# Patient Record
Sex: MALE | Race: WHITE
[De-identification: names, ages, dates, MRNs, and addresses within clinical notes are randomized per-mention and may not be internally consistent; named-entity substitution may affect disease eponyms.]

---

## 2021-09-20 ENCOUNTER — HOSPITAL ENCOUNTER (INPATIENT)
Dept: HOSPITAL 54 - ER | Age: 76
LOS: 12 days | Discharge: NURSING, REHAB OR LONG-TERM CARE | DRG: 208 | End: 2021-10-02
Attending: INTERNAL MEDICINE | Admitting: INTERNAL MEDICINE
Payer: MEDICARE

## 2021-09-20 VITALS — SYSTOLIC BLOOD PRESSURE: 140 MMHG | DIASTOLIC BLOOD PRESSURE: 75 MMHG

## 2021-09-20 VITALS — SYSTOLIC BLOOD PRESSURE: 175 MMHG | DIASTOLIC BLOOD PRESSURE: 85 MMHG

## 2021-09-20 VITALS — DIASTOLIC BLOOD PRESSURE: 55 MMHG | SYSTOLIC BLOOD PRESSURE: 108 MMHG

## 2021-09-20 VITALS — DIASTOLIC BLOOD PRESSURE: 33 MMHG | SYSTOLIC BLOOD PRESSURE: 110 MMHG

## 2021-09-20 VITALS — SYSTOLIC BLOOD PRESSURE: 90 MMHG | DIASTOLIC BLOOD PRESSURE: 53 MMHG

## 2021-09-20 VITALS — SYSTOLIC BLOOD PRESSURE: 120 MMHG | DIASTOLIC BLOOD PRESSURE: 55 MMHG

## 2021-09-20 VITALS — SYSTOLIC BLOOD PRESSURE: 110 MMHG | DIASTOLIC BLOOD PRESSURE: 58 MMHG

## 2021-09-20 VITALS — SYSTOLIC BLOOD PRESSURE: 126 MMHG | DIASTOLIC BLOOD PRESSURE: 72 MMHG

## 2021-09-20 VITALS — DIASTOLIC BLOOD PRESSURE: 70 MMHG | SYSTOLIC BLOOD PRESSURE: 120 MMHG

## 2021-09-20 VITALS — SYSTOLIC BLOOD PRESSURE: 95 MMHG | DIASTOLIC BLOOD PRESSURE: 58 MMHG

## 2021-09-20 VITALS — WEIGHT: 158 LBS | BODY MASS INDEX: 31.02 KG/M2 | HEIGHT: 60 IN

## 2021-09-20 VITALS — DIASTOLIC BLOOD PRESSURE: 52 MMHG | SYSTOLIC BLOOD PRESSURE: 107 MMHG

## 2021-09-20 VITALS — DIASTOLIC BLOOD PRESSURE: 82 MMHG | SYSTOLIC BLOOD PRESSURE: 180 MMHG

## 2021-09-20 VITALS — SYSTOLIC BLOOD PRESSURE: 110 MMHG | DIASTOLIC BLOOD PRESSURE: 87 MMHG

## 2021-09-20 VITALS — DIASTOLIC BLOOD PRESSURE: 54 MMHG | SYSTOLIC BLOOD PRESSURE: 120 MMHG

## 2021-09-20 VITALS — SYSTOLIC BLOOD PRESSURE: 150 MMHG | DIASTOLIC BLOOD PRESSURE: 80 MMHG

## 2021-09-20 VITALS — SYSTOLIC BLOOD PRESSURE: 94 MMHG | DIASTOLIC BLOOD PRESSURE: 61 MMHG

## 2021-09-20 DIAGNOSIS — J15.6: Primary | ICD-10-CM

## 2021-09-20 DIAGNOSIS — M41.9: ICD-10-CM

## 2021-09-20 DIAGNOSIS — I25.10: ICD-10-CM

## 2021-09-20 DIAGNOSIS — Z20.822: ICD-10-CM

## 2021-09-20 DIAGNOSIS — Z79.899: ICD-10-CM

## 2021-09-20 DIAGNOSIS — N17.9: ICD-10-CM

## 2021-09-20 DIAGNOSIS — I50.33: ICD-10-CM

## 2021-09-20 DIAGNOSIS — J96.22: ICD-10-CM

## 2021-09-20 DIAGNOSIS — N18.9: ICD-10-CM

## 2021-09-20 DIAGNOSIS — E66.2: ICD-10-CM

## 2021-09-20 DIAGNOSIS — Z98.1: ICD-10-CM

## 2021-09-20 DIAGNOSIS — H10.9: ICD-10-CM

## 2021-09-20 DIAGNOSIS — E87.6: ICD-10-CM

## 2021-09-20 DIAGNOSIS — N40.0: ICD-10-CM

## 2021-09-20 DIAGNOSIS — J96.21: ICD-10-CM

## 2021-09-20 DIAGNOSIS — G93.41: ICD-10-CM

## 2021-09-20 DIAGNOSIS — I13.0: ICD-10-CM

## 2021-09-20 DIAGNOSIS — Z88.8: ICD-10-CM

## 2021-09-20 DIAGNOSIS — E78.5: ICD-10-CM

## 2021-09-20 DIAGNOSIS — J98.4: ICD-10-CM

## 2021-09-20 DIAGNOSIS — G62.9: ICD-10-CM

## 2021-09-20 DIAGNOSIS — E83.42: ICD-10-CM

## 2021-09-20 DIAGNOSIS — Z88.5: ICD-10-CM

## 2021-09-20 DIAGNOSIS — F20.9: ICD-10-CM

## 2021-09-20 LAB
ALBUMIN SERPL BCP-MCNC: 3.3 G/DL (ref 3.4–5)
ALP SERPL-CCNC: 87 U/L (ref 46–116)
ALT SERPL W P-5'-P-CCNC: 28 U/L (ref 12–78)
AST SERPL W P-5'-P-CCNC: 24 U/L (ref 15–37)
BASE EXCESS BLDA CALC-SCNC: 10.5 MMOL/L
BASE EXCESS BLDA CALC-SCNC: 6.6 MMOL/L
BASE EXCESS BLDA CALC-SCNC: 7.8 MMOL/L
BASE EXCESS BLDA CALC-SCNC: 8.3 MMOL/L
BASOPHILS # BLD AUTO: 0 K/UL (ref 0–0.2)
BASOPHILS NFR BLD AUTO: 0.4 % (ref 0–2)
BILIRUB DIRECT SERPL-MCNC: 0.1 MG/DL (ref 0–0.2)
BILIRUB SERPL-MCNC: 0.4 MG/DL (ref 0.2–1)
BUN SERPL-MCNC: 19 MG/DL (ref 7–18)
CALCIUM SERPL-MCNC: 8.6 MG/DL (ref 8.5–10.1)
CHLORIDE SERPL-SCNC: 103 MMOL/L (ref 98–107)
CO2 SERPL-SCNC: 41 MMOL/L (ref 21–32)
CREAT SERPL-MCNC: 0.8 MG/DL (ref 0.6–1.3)
DO-HGB MFR BLDA: 0 MMHG
DO-HGB MFR BLDA: 161.5 MMHG
DO-HGB MFR BLDA: 185 MMHG
DO-HGB MFR BLDA: 43.2 MMHG
EOSINOPHIL NFR BLD AUTO: 0 % (ref 0–6)
GLUCOSE SERPL-MCNC: 141 MG/DL (ref 74–106)
HCT VFR BLD AUTO: 43 % (ref 39–51)
HGB BLD-MCNC: 14.3 G/DL (ref 13.5–17.5)
INHALED O2 CONCENTRATION: 35 %
INHALED O2 CONCENTRATION: 35 %
INHALED O2 CONCENTRATION: 40 %
INHALED O2 CONCENTRATION: 52 %
INHALED O2 FLOW RATE: 8 L/MIN (ref 0–30)
INTRINSIC PEEP RESPIRATORY: 5 CM H2O
LYMPHOCYTES NFR BLD AUTO: 0.8 K/UL (ref 0.8–4.8)
LYMPHOCYTES NFR BLD AUTO: 12.7 % (ref 20–44)
MCHC RBC AUTO-ENTMCNC: 34 G/DL (ref 31–36)
MCV RBC AUTO: 101 FL (ref 80–96)
MONOCYTES NFR BLD AUTO: 0.6 K/UL (ref 0.1–1.3)
MONOCYTES NFR BLD AUTO: 10.4 % (ref 2–12)
NEUTROPHILS # BLD AUTO: 4.6 K/UL (ref 1.8–8.9)
NEUTROPHILS NFR BLD AUTO: 76.5 % (ref 43–81)
PCO2 TEMP ADJ BLDA: 40.7 MMHG (ref 35–45)
PCO2 TEMP ADJ BLDA: 85.3 MMHG (ref 35–45)
PCO2 TEMP ADJ BLDA: 91.8 MMHG (ref 35–45)
PCO2 TEMP ADJ BLDA: 99.9 MMHG (ref 35–45)
PEEP SETTING VENT: 450 ML
PH TEMP ADJ BLDA: 7.22 [PH] (ref 7.35–7.45)
PH TEMP ADJ BLDA: 7.23 [PH] (ref 7.35–7.45)
PH TEMP ADJ BLDA: 7.3 [PH] (ref 7.35–7.45)
PH TEMP ADJ BLDA: 7.51 [PH] (ref 7.35–7.45)
PLATELET # BLD AUTO: 202 K/UL (ref 150–450)
PO2 TEMP ADJ BLDA: 142.6 MMHG (ref 75–100)
PO2 TEMP ADJ BLDA: 76.9 MMHG (ref 75–100)
PO2 TEMP ADJ BLDA: 90.2 MMHG (ref 75–100)
PO2 TEMP ADJ BLDA: 99.6 MMHG (ref 75–100)
POTASSIUM SERPL-SCNC: 4.3 MMOL/L (ref 3.5–5.1)
PROT SERPL-MCNC: 7.9 G/DL (ref 6.4–8.2)
RBC # BLD AUTO: 4.22 MIL/UL (ref 4.5–6)
SAO2 % BLDA: 95.5 % (ref 92–98.5)
SAO2 % BLDA: 96.4 % (ref 92–98.5)
SAO2 % BLDA: 96.7 % (ref 92–98.5)
SAO2 % BLDA: 98.4 % (ref 92–98.5)
SET RATE, BG: 12
SET RATE, BG: 12
SET RATE, BG: 18
SODIUM SERPL-SCNC: 143 MMOL/L (ref 136–145)
VENTILATION MODE VENT: (no result)
WBC NRBC COR # BLD AUTO: 6.1 K/UL (ref 4.3–11)

## 2021-09-20 PROCEDURE — 5A1945Z RESPIRATORY VENTILATION, 24-96 CONSECUTIVE HOURS: ICD-10-PCS | Performed by: INTERNAL MEDICINE

## 2021-09-20 PROCEDURE — G0378 HOSPITAL OBSERVATION PER HR: HCPCS

## 2021-09-20 PROCEDURE — C9803 HOPD COVID-19 SPEC COLLECT: HCPCS

## 2021-09-20 PROCEDURE — A4217 STERILE WATER/SALINE, 500 ML: HCPCS

## 2021-09-20 PROCEDURE — A9563 P32 NA PHOSPHATE: HCPCS

## 2021-09-20 PROCEDURE — A6403 STERILE GAUZE>16 <= 48 SQ IN: HCPCS

## 2021-09-20 PROCEDURE — A4623 TRACHEOSTOMY INNER CANNULA: HCPCS

## 2021-09-20 PROCEDURE — U0003 INFECTIOUS AGENT DETECTION BY NUCLEIC ACID (DNA OR RNA); SEVERE ACUTE RESPIRATORY SYNDROME CORONAVIRUS 2 (SARS-COV-2) (CORONAVIRUS DISEASE [COVID-19]), AMPLIFIED PROBE TECHNIQUE, MAKING USE OF HIGH THROUGHPUT TECHNOLOGIES AS DESCRIBED BY CMS-2020-01-R: HCPCS

## 2021-09-20 PROCEDURE — 0BH18EZ INSERTION OF ENDOTRACHEAL AIRWAY INTO TRACHEA, VIA NATURAL OR ARTIFICIAL OPENING ENDOSCOPIC: ICD-10-PCS

## 2021-09-20 PROCEDURE — 05H933Z INSERTION OF INFUSION DEVICE INTO RIGHT BRACHIAL VEIN, PERCUTANEOUS APPROACH: ICD-10-PCS | Performed by: NURSE PRACTITIONER

## 2021-09-20 PROCEDURE — 5A09357 ASSISTANCE WITH RESPIRATORY VENTILATION, LESS THAN 24 CONSECUTIVE HOURS, CONTINUOUS POSITIVE AIRWAY PRESSURE: ICD-10-PCS | Performed by: INTERNAL MEDICINE

## 2021-09-20 RX ADMIN — DEXTROSE MONOHYDRATE SCH MLS/HR: 50 INJECTION, SOLUTION INTRAVENOUS at 16:27

## 2021-09-20 RX ADMIN — METOPROLOL TARTRATE SCH MG: 50 TABLET, FILM COATED ORAL at 17:54

## 2021-09-20 RX ADMIN — Medication SCH MG: at 22:00

## 2021-09-20 RX ADMIN — PIPERACILLIN SODIUM AND TAZOBACTAM SODIUM SCH MLS/HR: .5; 4 INJECTION, POWDER, LYOPHILIZED, FOR SOLUTION INTRAVENOUS at 18:52

## 2021-09-20 RX ADMIN — Medication SCH MG: at 17:54

## 2021-09-20 RX ADMIN — GABAPENTIN SCH MG: 100 CAPSULE ORAL at 13:00

## 2021-09-20 RX ADMIN — METOPROLOL TARTRATE SCH MG: 50 TABLET, FILM COATED ORAL at 17:00

## 2021-09-20 RX ADMIN — FLUVOXAMINE MALEATE SCH MG: 50 TABLET, FILM COATED ORAL at 22:00

## 2021-09-20 RX ADMIN — PROPOFOL PRN MLS/HR: 10 INJECTION, EMULSION INTRAVENOUS at 17:52

## 2021-09-20 RX ADMIN — SODIUM CHLORIDE PRN MLS/HR: 9 INJECTION, SOLUTION INTRAVENOUS at 18:53

## 2021-09-20 RX ADMIN — GABAPENTIN SCH MG: 100 CAPSULE ORAL at 18:54

## 2021-09-20 RX ADMIN — FAMOTIDINE SCH MG: 20 TABLET, FILM COATED ORAL at 17:53

## 2021-09-20 RX ADMIN — ENOXAPARIN SODIUM SCH MG: 40 INJECTION SUBCUTANEOUS at 13:10

## 2021-09-20 RX ADMIN — TAMSULOSIN HYDROCHLORIDE SCH MG: 0.4 CAPSULE ORAL at 18:52

## 2021-09-20 NOTE — NUR
RN ICU

ADMITTED INTO ROOM 260 FROM ER BY [facility]ANTE WITH MONITOR.  REPORT RECEIVED FROM [facility].  PT 
ADMITTED FOR RESP FAILURE/CHF.  PT AROUSEABLE TO VIGOROUS STIMULATION.  EASILY FALLS ASLEEP. 
 PLACED ON BIPAP.

## 2021-09-20 NOTE — NUR
ICU RN. INITIAL ASSESSMENT. RECEIVED THE PT REST ON THE BED. ORALLY INTUABTED. ETT 7.5CM, 
LIP 27, AC 18, , FIO2 40%, PEEP 5. SAT 98%. NO ACUTE DISTRESS NOTED. CARDIAC MONITOR 
SHOWING NSR. IV RT AND LT HAND 20G. PROPOFOL 20MCG/KG/MIN, NGT INTACT. FC PATENT. WILL 
CONTINUE TO MONITOR .

## 2021-09-20 NOTE — NUR
-------------------------------------------------------------------------------

          *** Note undone in [facility] - 09/20/21 at 1119 by MIHAELA ***           

-------------------------------------------------------------------------------

ER AT [facility] FOR ABG.

## 2021-09-20 NOTE — NUR
RT END OF THE SHIFT REPORT,



PT. 76 Y OLD MALE REC. FROM ER T/O DAY ON BIPAP, POST ABG BIPAP CHANGES DONE PER DR. LIMON

@[id number] ASSISTED ER DR. DASILVA X2 RT FOR INTUBATION, ETT 7.5 @ 25 CM, GOOD COLOR EXCHANGED 
VIA CAPNOGRAPHY, BILATERALLY CHEST RISE NOTED B/S RHONCHI PLACED ON VENT WITH NOTED 
SETTINGS, VENT PLUGGED INTO RED OUTLET POST INTUBATION ETT ADJUSTED PER DR. LIMON AT THE 
BEDSIDE. PUSH DOWN 2CM (ETT 27CM LIPLINE) POST ABG VT CHANGED  PER DR. LIMON ORDER. RN 
NOTIFIED THE CHANGES. PT. TABLE. AMBU BAG REMAIN AT THE BEDSIDE. 



REPORT WILL PASS TO PM SHIFT 

-------------------------------------------------------------------------------

Addendum: 09/20/21 at 1813 by LORENA GOMES RT

-------------------------------------------------------------------------------

Amended: Links added.

## 2021-09-20 NOTE — NUR
RT



POST ABG RESULTS SHOWN TO DR. DASILVA. PLACED PT ON BIPAP PER MD ORDER. [facility] RN NOTIFIED 
AND AWARE OF CHANGES. WILL CONTINUE TO MONITOR THE PATIENT CLOSELY FOR ANY CHANGES. 

-------------------------------------------------------------------------------

Addendum: 09/20/21 at 1400 by HELEN MITCHELL RT

-------------------------------------------------------------------------------

Amended: Links added.

## 2021-09-21 VITALS — DIASTOLIC BLOOD PRESSURE: 56 MMHG | SYSTOLIC BLOOD PRESSURE: 80 MMHG

## 2021-09-21 VITALS — SYSTOLIC BLOOD PRESSURE: 113 MMHG | DIASTOLIC BLOOD PRESSURE: 73 MMHG

## 2021-09-21 VITALS — DIASTOLIC BLOOD PRESSURE: 67 MMHG | SYSTOLIC BLOOD PRESSURE: 100 MMHG

## 2021-09-21 VITALS — SYSTOLIC BLOOD PRESSURE: 129 MMHG | DIASTOLIC BLOOD PRESSURE: 78 MMHG

## 2021-09-21 VITALS — SYSTOLIC BLOOD PRESSURE: 109 MMHG | DIASTOLIC BLOOD PRESSURE: 61 MMHG

## 2021-09-21 VITALS — SYSTOLIC BLOOD PRESSURE: 129 MMHG | DIASTOLIC BLOOD PRESSURE: 79 MMHG

## 2021-09-21 VITALS — SYSTOLIC BLOOD PRESSURE: 111 MMHG | DIASTOLIC BLOOD PRESSURE: 63 MMHG

## 2021-09-21 VITALS — DIASTOLIC BLOOD PRESSURE: 69 MMHG | SYSTOLIC BLOOD PRESSURE: 115 MMHG

## 2021-09-21 VITALS — DIASTOLIC BLOOD PRESSURE: 80 MMHG | SYSTOLIC BLOOD PRESSURE: 138 MMHG

## 2021-09-21 VITALS — SYSTOLIC BLOOD PRESSURE: 117 MMHG | DIASTOLIC BLOOD PRESSURE: 75 MMHG

## 2021-09-21 VITALS — DIASTOLIC BLOOD PRESSURE: 60 MMHG | SYSTOLIC BLOOD PRESSURE: 111 MMHG

## 2021-09-21 VITALS — DIASTOLIC BLOOD PRESSURE: 67 MMHG | SYSTOLIC BLOOD PRESSURE: 111 MMHG

## 2021-09-21 VITALS — DIASTOLIC BLOOD PRESSURE: 73 MMHG | SYSTOLIC BLOOD PRESSURE: 116 MMHG

## 2021-09-21 VITALS — SYSTOLIC BLOOD PRESSURE: 106 MMHG | DIASTOLIC BLOOD PRESSURE: 63 MMHG

## 2021-09-21 VITALS — DIASTOLIC BLOOD PRESSURE: 70 MMHG | SYSTOLIC BLOOD PRESSURE: 116 MMHG

## 2021-09-21 VITALS — SYSTOLIC BLOOD PRESSURE: 109 MMHG | DIASTOLIC BLOOD PRESSURE: 70 MMHG

## 2021-09-21 VITALS — DIASTOLIC BLOOD PRESSURE: 70 MMHG | SYSTOLIC BLOOD PRESSURE: 111 MMHG

## 2021-09-21 VITALS — SYSTOLIC BLOOD PRESSURE: 117 MMHG | DIASTOLIC BLOOD PRESSURE: 65 MMHG

## 2021-09-21 VITALS — DIASTOLIC BLOOD PRESSURE: 84 MMHG | SYSTOLIC BLOOD PRESSURE: 121 MMHG

## 2021-09-21 VITALS — SYSTOLIC BLOOD PRESSURE: 110 MMHG | DIASTOLIC BLOOD PRESSURE: 67 MMHG

## 2021-09-21 VITALS — DIASTOLIC BLOOD PRESSURE: 59 MMHG | SYSTOLIC BLOOD PRESSURE: 115 MMHG

## 2021-09-21 VITALS — SYSTOLIC BLOOD PRESSURE: 107 MMHG | DIASTOLIC BLOOD PRESSURE: 65 MMHG

## 2021-09-21 VITALS — SYSTOLIC BLOOD PRESSURE: 118 MMHG | DIASTOLIC BLOOD PRESSURE: 74 MMHG

## 2021-09-21 VITALS — SYSTOLIC BLOOD PRESSURE: 96 MMHG | DIASTOLIC BLOOD PRESSURE: 53 MMHG

## 2021-09-21 VITALS — SYSTOLIC BLOOD PRESSURE: 120 MMHG | DIASTOLIC BLOOD PRESSURE: 60 MMHG

## 2021-09-21 VITALS — DIASTOLIC BLOOD PRESSURE: 70 MMHG | SYSTOLIC BLOOD PRESSURE: 97 MMHG

## 2021-09-21 VITALS — DIASTOLIC BLOOD PRESSURE: 64 MMHG | SYSTOLIC BLOOD PRESSURE: 111 MMHG

## 2021-09-21 VITALS — SYSTOLIC BLOOD PRESSURE: 111 MMHG | DIASTOLIC BLOOD PRESSURE: 71 MMHG

## 2021-09-21 VITALS — SYSTOLIC BLOOD PRESSURE: 99 MMHG | DIASTOLIC BLOOD PRESSURE: 67 MMHG

## 2021-09-21 VITALS — DIASTOLIC BLOOD PRESSURE: 68 MMHG | SYSTOLIC BLOOD PRESSURE: 115 MMHG

## 2021-09-21 VITALS — SYSTOLIC BLOOD PRESSURE: 79 MMHG | DIASTOLIC BLOOD PRESSURE: 54 MMHG

## 2021-09-21 VITALS — SYSTOLIC BLOOD PRESSURE: 122 MMHG | DIASTOLIC BLOOD PRESSURE: 75 MMHG

## 2021-09-21 VITALS — SYSTOLIC BLOOD PRESSURE: 115 MMHG | DIASTOLIC BLOOD PRESSURE: 73 MMHG

## 2021-09-21 VITALS — DIASTOLIC BLOOD PRESSURE: 49 MMHG | SYSTOLIC BLOOD PRESSURE: 130 MMHG

## 2021-09-21 VITALS — SYSTOLIC BLOOD PRESSURE: 108 MMHG | DIASTOLIC BLOOD PRESSURE: 65 MMHG

## 2021-09-21 VITALS — DIASTOLIC BLOOD PRESSURE: 73 MMHG | SYSTOLIC BLOOD PRESSURE: 117 MMHG

## 2021-09-21 VITALS — SYSTOLIC BLOOD PRESSURE: 82 MMHG | DIASTOLIC BLOOD PRESSURE: 52 MMHG

## 2021-09-21 VITALS — SYSTOLIC BLOOD PRESSURE: 127 MMHG | DIASTOLIC BLOOD PRESSURE: 75 MMHG

## 2021-09-21 VITALS — DIASTOLIC BLOOD PRESSURE: 72 MMHG | SYSTOLIC BLOOD PRESSURE: 119 MMHG

## 2021-09-21 VITALS — DIASTOLIC BLOOD PRESSURE: 60 MMHG | SYSTOLIC BLOOD PRESSURE: 101 MMHG

## 2021-09-21 VITALS — DIASTOLIC BLOOD PRESSURE: 66 MMHG | SYSTOLIC BLOOD PRESSURE: 121 MMHG

## 2021-09-21 VITALS — DIASTOLIC BLOOD PRESSURE: 81 MMHG | SYSTOLIC BLOOD PRESSURE: 111 MMHG

## 2021-09-21 VITALS — SYSTOLIC BLOOD PRESSURE: 134 MMHG | DIASTOLIC BLOOD PRESSURE: 85 MMHG

## 2021-09-21 VITALS — SYSTOLIC BLOOD PRESSURE: 131 MMHG | DIASTOLIC BLOOD PRESSURE: 67 MMHG

## 2021-09-21 VITALS — SYSTOLIC BLOOD PRESSURE: 110 MMHG | DIASTOLIC BLOOD PRESSURE: 69 MMHG

## 2021-09-21 VITALS — DIASTOLIC BLOOD PRESSURE: 78 MMHG | SYSTOLIC BLOOD PRESSURE: 117 MMHG

## 2021-09-21 VITALS — DIASTOLIC BLOOD PRESSURE: 66 MMHG | SYSTOLIC BLOOD PRESSURE: 116 MMHG

## 2021-09-21 VITALS — SYSTOLIC BLOOD PRESSURE: 136 MMHG | DIASTOLIC BLOOD PRESSURE: 79 MMHG

## 2021-09-21 VITALS — DIASTOLIC BLOOD PRESSURE: 33 MMHG | SYSTOLIC BLOOD PRESSURE: 83 MMHG

## 2021-09-21 VITALS — DIASTOLIC BLOOD PRESSURE: 54 MMHG | SYSTOLIC BLOOD PRESSURE: 95 MMHG

## 2021-09-21 VITALS — DIASTOLIC BLOOD PRESSURE: 62 MMHG | SYSTOLIC BLOOD PRESSURE: 106 MMHG

## 2021-09-21 VITALS — DIASTOLIC BLOOD PRESSURE: 68 MMHG | SYSTOLIC BLOOD PRESSURE: 125 MMHG

## 2021-09-21 VITALS — DIASTOLIC BLOOD PRESSURE: 62 MMHG | SYSTOLIC BLOOD PRESSURE: 113 MMHG

## 2021-09-21 VITALS — SYSTOLIC BLOOD PRESSURE: 108 MMHG | DIASTOLIC BLOOD PRESSURE: 63 MMHG

## 2021-09-21 VITALS — DIASTOLIC BLOOD PRESSURE: 69 MMHG | SYSTOLIC BLOOD PRESSURE: 97 MMHG

## 2021-09-21 VITALS — SYSTOLIC BLOOD PRESSURE: 101 MMHG | DIASTOLIC BLOOD PRESSURE: 59 MMHG

## 2021-09-21 VITALS — SYSTOLIC BLOOD PRESSURE: 122 MMHG | DIASTOLIC BLOOD PRESSURE: 71 MMHG

## 2021-09-21 VITALS — SYSTOLIC BLOOD PRESSURE: 102 MMHG | DIASTOLIC BLOOD PRESSURE: 60 MMHG

## 2021-09-21 VITALS — DIASTOLIC BLOOD PRESSURE: 76 MMHG | SYSTOLIC BLOOD PRESSURE: 114 MMHG

## 2021-09-21 VITALS — DIASTOLIC BLOOD PRESSURE: 59 MMHG | SYSTOLIC BLOOD PRESSURE: 94 MMHG

## 2021-09-21 LAB
ALBUMIN SERPL BCP-MCNC: 3.4 G/DL (ref 3.4–5)
ALP SERPL-CCNC: 88 U/L (ref 46–116)
ALT SERPL W P-5'-P-CCNC: 28 U/L (ref 12–78)
AST SERPL W P-5'-P-CCNC: 28 U/L (ref 15–37)
BASE EXCESS BLDA CALC-SCNC: -0.6 MMOL/L
BASOPHILS # BLD AUTO: 0.1 K/UL (ref 0–0.2)
BASOPHILS NFR BLD AUTO: 0.4 % (ref 0–2)
BILIRUB SERPL-MCNC: 1.4 MG/DL (ref 0.2–1)
BUN SERPL-MCNC: 24 MG/DL (ref 7–18)
CALCIUM SERPL-MCNC: 8.8 MG/DL (ref 8.5–10.1)
CHLORIDE SERPL-SCNC: 99 MMOL/L (ref 98–107)
CHOLEST SERPL-MCNC: 124 MG/DL (ref ?–200)
CO2 SERPL-SCNC: 30 MMOL/L (ref 21–32)
CREAT SERPL-MCNC: 1.2 MG/DL (ref 0.6–1.3)
DO-HGB MFR BLDA: 73.8 MMHG
EOSINOPHIL NFR BLD AUTO: 0.1 % (ref 0–6)
GLUCOSE SERPL-MCNC: 166 MG/DL (ref 74–106)
HCT VFR BLD AUTO: 46 % (ref 39–51)
HDLC SERPL-MCNC: 48 MG/DL (ref 40–60)
HGB BLD-MCNC: 15.3 G/DL (ref 13.5–17.5)
INHALED O2 CONCENTRATION: 40 %
LDLC SERPL DIRECT ASSAY-MCNC: 61 MG/DL (ref 0–99)
LYMPHOCYTES NFR BLD AUTO: 0.6 K/UL (ref 0.8–4.8)
LYMPHOCYTES NFR BLD AUTO: 4.1 % (ref 20–44)
LYMPHOCYTES NFR BLD MANUAL: 5 % (ref 16–48)
MAGNESIUM SERPL-MCNC: 1.7 MG/DL (ref 1.8–2.4)
MCHC RBC AUTO-ENTMCNC: 33 G/DL (ref 31–36)
MCV RBC AUTO: 101 FL (ref 80–96)
MONOCYTES NFR BLD AUTO: 15.4 % (ref 2–12)
MONOCYTES NFR BLD AUTO: 2.1 K/UL (ref 0.1–1.3)
MONOCYTES NFR BLD MANUAL: 16 % (ref 0–11)
NEUTROPHILS # BLD AUTO: 10.9 K/UL (ref 1.8–8.9)
NEUTROPHILS NFR BLD AUTO: 80 % (ref 43–81)
NEUTS SEG NFR BLD MANUAL: 79 % (ref 42–76)
PCO2 TEMP ADJ BLDA: 35.3 MMHG (ref 35–45)
PH TEMP ADJ BLDA: 7.43 [PH] (ref 7.35–7.45)
PHOSPHATE SERPL-MCNC: 2.4 MG/DL (ref 2.5–4.9)
PLATELET # BLD AUTO: 243 K/UL (ref 150–450)
PO2 TEMP ADJ BLDA: 170.8 MMHG (ref 75–100)
POTASSIUM SERPL-SCNC: 3.2 MMOL/L (ref 3.5–5.1)
PROT SERPL-MCNC: 8.2 G/DL (ref 6.4–8.2)
RBC # BLD AUTO: 4.6 MIL/UL (ref 4.5–6)
SAO2 % BLDA: 99.3 % (ref 92–98.5)
SODIUM SERPL-SCNC: 140 MMOL/L (ref 136–145)
TRIGL SERPL-MCNC: 143 MG/DL (ref 30–150)
VENTILATION MODE VENT: (no result)
WBC NRBC COR # BLD AUTO: 13.6 K/UL (ref 4.3–11)

## 2021-09-21 RX ADMIN — FAMOTIDINE SCH MG: 20 TABLET, FILM COATED ORAL at 17:17

## 2021-09-21 RX ADMIN — PROPOFOL PRN MLS/HR: 10 INJECTION, EMULSION INTRAVENOUS at 06:50

## 2021-09-21 RX ADMIN — GABAPENTIN SCH MG: 100 CAPSULE ORAL at 08:33

## 2021-09-21 RX ADMIN — PROPOFOL PRN MLS/HR: 10 INJECTION, EMULSION INTRAVENOUS at 20:37

## 2021-09-21 RX ADMIN — MAGNESIUM SULFATE IN DEXTROSE SCH MLS/HR: 10 INJECTION, SOLUTION INTRAVENOUS at 10:04

## 2021-09-21 RX ADMIN — SODIUM CHLORIDE PRN MLS/HR: 9 INJECTION, SOLUTION INTRAVENOUS at 03:43

## 2021-09-21 RX ADMIN — POTASSIUM CHLORIDE SCH MLS/HR: 200 INJECTION, SOLUTION INTRAVENOUS at 10:03

## 2021-09-21 RX ADMIN — FAMOTIDINE SCH MG: 20 TABLET, FILM COATED ORAL at 08:33

## 2021-09-21 RX ADMIN — POTASSIUM CHLORIDE SCH MLS/HR: 200 INJECTION, SOLUTION INTRAVENOUS at 13:11

## 2021-09-21 RX ADMIN — ATORVASTATIN CALCIUM SCH MG: 40 TABLET, FILM COATED ORAL at 00:52

## 2021-09-21 RX ADMIN — PROPOFOL PRN MLS/HR: 10 INJECTION, EMULSION INTRAVENOUS at 10:04

## 2021-09-21 RX ADMIN — ENOXAPARIN SODIUM SCH MG: 40 INJECTION SUBCUTANEOUS at 14:28

## 2021-09-21 RX ADMIN — GABAPENTIN SCH MG: 100 CAPSULE ORAL at 17:17

## 2021-09-21 RX ADMIN — PIPERACILLIN SODIUM AND TAZOBACTAM SODIUM SCH MLS/HR: .375; 3 INJECTION, POWDER, LYOPHILIZED, FOR SOLUTION INTRAVENOUS at 19:57

## 2021-09-21 RX ADMIN — METOPROLOL TARTRATE SCH MG: 50 TABLET, FILM COATED ORAL at 08:30

## 2021-09-21 RX ADMIN — PIPERACILLIN SODIUM AND TAZOBACTAM SODIUM SCH MLS/HR: .5; 4 INJECTION, POWDER, LYOPHILIZED, FOR SOLUTION INTRAVENOUS at 05:19

## 2021-09-21 RX ADMIN — Medication SCH MG: at 17:18

## 2021-09-21 RX ADMIN — GABAPENTIN SCH MG: 100 CAPSULE ORAL at 13:00

## 2021-09-21 RX ADMIN — ATORVASTATIN CALCIUM SCH MG: 40 TABLET, FILM COATED ORAL at 21:41

## 2021-09-21 RX ADMIN — PIPERACILLIN SODIUM AND TAZOBACTAM SODIUM SCH MLS/HR: .375; 3 INJECTION, POWDER, LYOPHILIZED, FOR SOLUTION INTRAVENOUS at 12:09

## 2021-09-21 RX ADMIN — POTASSIUM CHLORIDE SCH MLS/HR: 200 INJECTION, SOLUTION INTRAVENOUS at 12:08

## 2021-09-21 RX ADMIN — POTASSIUM CHLORIDE SCH MLS/HR: 200 INJECTION, SOLUTION INTRAVENOUS at 11:03

## 2021-09-21 RX ADMIN — Medication SCH MG: at 08:33

## 2021-09-21 RX ADMIN — FERROUS SULFATE TAB 325 MG (65 MG ELEMENTAL FE) SCH MG: 325 (65 FE) TAB at 08:33

## 2021-09-21 RX ADMIN — Medication SCH MG: at 21:41

## 2021-09-21 RX ADMIN — SODIUM CHLORIDE PRN MLS/HR: 9 INJECTION, SOLUTION INTRAVENOUS at 12:20

## 2021-09-21 RX ADMIN — METOPROLOL TARTRATE SCH MG: 50 TABLET, FILM COATED ORAL at 17:00

## 2021-09-21 RX ADMIN — FLUVOXAMINE MALEATE SCH MG: 50 TABLET, FILM COATED ORAL at 21:41

## 2021-09-21 RX ADMIN — TAMSULOSIN HYDROCHLORIDE SCH MG: 0.4 CAPSULE ORAL at 17:18

## 2021-09-21 RX ADMIN — PROPOFOL PRN MLS/HR: 10 INJECTION, EMULSION INTRAVENOUS at 15:30

## 2021-09-21 RX ADMIN — PIPERACILLIN SODIUM AND TAZOBACTAM SODIUM SCH MLS/HR: .5; 4 INJECTION, POWDER, LYOPHILIZED, FOR SOLUTION INTRAVENOUS at 00:54

## 2021-09-21 RX ADMIN — MAGNESIUM SULFATE IN DEXTROSE SCH MLS/HR: 10 INJECTION, SOLUTION INTRAVENOUS at 11:03

## 2021-09-21 RX ADMIN — DEXTROSE MONOHYDRATE SCH MLS/HR: 50 INJECTION, SOLUTION INTRAVENOUS at 08:34

## 2021-09-21 RX ADMIN — PROPOFOL PRN MLS/HR: 10 INJECTION, EMULSION INTRAVENOUS at 00:54

## 2021-09-21 NOTE — NUR
icu rn. am care given. remaining samr vent setting tolerated well. sat 98%. no acute 
distress noted. cardiac monitor showing nsr. iv rt upper arm mid line. diprivan 
40mcg/kg/min, levophed 0.2mcg/kg/min. ngt intact. fc patent. urine draining, hob elevated. 
temp 101.3. cold bath given. will continue to monitor vitals.

## 2021-09-21 NOTE — NUR
RN CLOSING NOTES

Patient sedated and on diprivan drip running 40mcg/kg/min and levo at 0.05 mcg/kg to right 
upper arm midline. Vitals stable and tolerating vent settings. Patient is noted with left ac 
iv access saline lock. Bilateral wrist restraints noted and no s/s of skin breakdown. 
Orogastric tube clamped and patient remains NPO. HOB kept elevated. Velazquez intact and hanging 
to gravity with clear yellow urine noted with output of 300 cc. Colostomy bag noted to right 
lower abdomen with output of 100 cc of green/brown stool. informed NP Roselia regarding the 
diet order and waiting for call back. Endorsement done to next shift to follow up. Will 
continue to monitor. Call light with in reach.

## 2021-09-21 NOTE — NUR
RN OPENING NOTES

Patient received sedated on diprivan  running at 40 mcg/kg/min and levophed running at 
0.2mcg to right upper arm midline. No s/s of respiratory distress. Patient is noted with 
left ac iv access saline lock. Bilateral wrist restraints noted and no s/s of skin 
breakdown. Orogastric tube clamped and patient remains NPO. HOB kept elevated. Velazquez intact 
and hanging to gravity with clear yellow urine noted. Colostomy bag noted to right lower 
abdomen. Will continue to monitor. Call light with in reach.

## 2021-09-21 NOTE — NUR
RN NOTE 



RECEIVED PATIENT ON R/O COVID AIRBORNE ISOLATION. PATIENT IN BED. SEDATED. ON MECHANICAL 
VENT: ET 7.5/ 23 AC 24,  PEEP5. NO RESP DISTRESS. NO S/S PAIN NOTED. TELE MONITOR 
READS SINUS RHYTHM. IN NO APPARENT DISTRESS. IV ACCESS IN ANTONIETTA MIDLINE RUNNING PROPOFOL @40 
AND LEVO @0.05. RAC#20 AND LAC#20 PATENT AND SALINE LOCKED. ANDERS CATHETER IS PRESENT 
DRAINING TO GRAVITY, URINE IS YELLOW. COLOSTOMY BAG IN RLQ. OG TUBE PRESENT, CURRENTLY 
CLAMPED, NO RESIDUAL, PATIENT IS NPO. BED IS LOW AND LOCKED, HOB ELEVATED IN SEMI FOWLERS, 
SIDE RAILS UP X2, WILL CONTINUE TO MONITOR THROUGHOUT SHIFT

## 2021-09-21 NOTE — NUR
RT NOTE

LATE ENTRY: Pt rec'd orally intubated via ETT sz #7.5 secured at 27cm at the lipline. Pt on 
[facility] vent on AC mode settings as charted. ETT is patent and secured. Pt sx'd for thick LARGE 
amt of pink frothy secretions. Alarms are set and audible. Ambu bag bedside. Vent plugged 
into red outlet. Will continue to monitor closely.

-------------------------------------------------------------------------------

Addendum: 09/21/21 at 0641 by ARIEL CASTRO RT

-------------------------------------------------------------------------------

Amended: Links added.

## 2021-09-22 VITALS — SYSTOLIC BLOOD PRESSURE: 115 MMHG | DIASTOLIC BLOOD PRESSURE: 62 MMHG

## 2021-09-22 VITALS — SYSTOLIC BLOOD PRESSURE: 115 MMHG | DIASTOLIC BLOOD PRESSURE: 66 MMHG

## 2021-09-22 VITALS — DIASTOLIC BLOOD PRESSURE: 65 MMHG | SYSTOLIC BLOOD PRESSURE: 112 MMHG

## 2021-09-22 VITALS — DIASTOLIC BLOOD PRESSURE: 73 MMHG | SYSTOLIC BLOOD PRESSURE: 131 MMHG

## 2021-09-22 VITALS — DIASTOLIC BLOOD PRESSURE: 77 MMHG | SYSTOLIC BLOOD PRESSURE: 132 MMHG

## 2021-09-22 VITALS — SYSTOLIC BLOOD PRESSURE: 113 MMHG | DIASTOLIC BLOOD PRESSURE: 68 MMHG

## 2021-09-22 VITALS — SYSTOLIC BLOOD PRESSURE: 124 MMHG | DIASTOLIC BLOOD PRESSURE: 75 MMHG

## 2021-09-22 VITALS — DIASTOLIC BLOOD PRESSURE: 68 MMHG | SYSTOLIC BLOOD PRESSURE: 117 MMHG

## 2021-09-22 VITALS — DIASTOLIC BLOOD PRESSURE: 73 MMHG | SYSTOLIC BLOOD PRESSURE: 112 MMHG

## 2021-09-22 VITALS — SYSTOLIC BLOOD PRESSURE: 118 MMHG | DIASTOLIC BLOOD PRESSURE: 70 MMHG

## 2021-09-22 VITALS — DIASTOLIC BLOOD PRESSURE: 71 MMHG | SYSTOLIC BLOOD PRESSURE: 118 MMHG

## 2021-09-22 VITALS — SYSTOLIC BLOOD PRESSURE: 122 MMHG | DIASTOLIC BLOOD PRESSURE: 73 MMHG

## 2021-09-22 VITALS — SYSTOLIC BLOOD PRESSURE: 117 MMHG | DIASTOLIC BLOOD PRESSURE: 62 MMHG

## 2021-09-22 VITALS — SYSTOLIC BLOOD PRESSURE: 120 MMHG | DIASTOLIC BLOOD PRESSURE: 69 MMHG

## 2021-09-22 VITALS — DIASTOLIC BLOOD PRESSURE: 64 MMHG | SYSTOLIC BLOOD PRESSURE: 103 MMHG

## 2021-09-22 VITALS — SYSTOLIC BLOOD PRESSURE: 125 MMHG | DIASTOLIC BLOOD PRESSURE: 81 MMHG

## 2021-09-22 VITALS — SYSTOLIC BLOOD PRESSURE: 115 MMHG | DIASTOLIC BLOOD PRESSURE: 65 MMHG

## 2021-09-22 VITALS — SYSTOLIC BLOOD PRESSURE: 108 MMHG | DIASTOLIC BLOOD PRESSURE: 65 MMHG

## 2021-09-22 VITALS — SYSTOLIC BLOOD PRESSURE: 109 MMHG | DIASTOLIC BLOOD PRESSURE: 59 MMHG

## 2021-09-22 VITALS — SYSTOLIC BLOOD PRESSURE: 142 MMHG | DIASTOLIC BLOOD PRESSURE: 66 MMHG

## 2021-09-22 VITALS — DIASTOLIC BLOOD PRESSURE: 62 MMHG | SYSTOLIC BLOOD PRESSURE: 117 MMHG

## 2021-09-22 VITALS — SYSTOLIC BLOOD PRESSURE: 127 MMHG | DIASTOLIC BLOOD PRESSURE: 73 MMHG

## 2021-09-22 VITALS — SYSTOLIC BLOOD PRESSURE: 123 MMHG | DIASTOLIC BLOOD PRESSURE: 71 MMHG

## 2021-09-22 VITALS — SYSTOLIC BLOOD PRESSURE: 139 MMHG | DIASTOLIC BLOOD PRESSURE: 75 MMHG

## 2021-09-22 VITALS — DIASTOLIC BLOOD PRESSURE: 79 MMHG | SYSTOLIC BLOOD PRESSURE: 145 MMHG

## 2021-09-22 VITALS — DIASTOLIC BLOOD PRESSURE: 60 MMHG | SYSTOLIC BLOOD PRESSURE: 93 MMHG

## 2021-09-22 VITALS — DIASTOLIC BLOOD PRESSURE: 82 MMHG | SYSTOLIC BLOOD PRESSURE: 137 MMHG

## 2021-09-22 VITALS — DIASTOLIC BLOOD PRESSURE: 65 MMHG | SYSTOLIC BLOOD PRESSURE: 108 MMHG

## 2021-09-22 VITALS — DIASTOLIC BLOOD PRESSURE: 80 MMHG | SYSTOLIC BLOOD PRESSURE: 128 MMHG

## 2021-09-22 VITALS — DIASTOLIC BLOOD PRESSURE: 71 MMHG | SYSTOLIC BLOOD PRESSURE: 116 MMHG

## 2021-09-22 VITALS — DIASTOLIC BLOOD PRESSURE: 78 MMHG | SYSTOLIC BLOOD PRESSURE: 118 MMHG

## 2021-09-22 VITALS — SYSTOLIC BLOOD PRESSURE: 95 MMHG | DIASTOLIC BLOOD PRESSURE: 49 MMHG

## 2021-09-22 VITALS — SYSTOLIC BLOOD PRESSURE: 101 MMHG | DIASTOLIC BLOOD PRESSURE: 62 MMHG

## 2021-09-22 VITALS — DIASTOLIC BLOOD PRESSURE: 69 MMHG | SYSTOLIC BLOOD PRESSURE: 121 MMHG

## 2021-09-22 VITALS — DIASTOLIC BLOOD PRESSURE: 69 MMHG | SYSTOLIC BLOOD PRESSURE: 115 MMHG

## 2021-09-22 VITALS — DIASTOLIC BLOOD PRESSURE: 60 MMHG | SYSTOLIC BLOOD PRESSURE: 107 MMHG

## 2021-09-22 VITALS — SYSTOLIC BLOOD PRESSURE: 98 MMHG | DIASTOLIC BLOOD PRESSURE: 63 MMHG

## 2021-09-22 VITALS — SYSTOLIC BLOOD PRESSURE: 126 MMHG | DIASTOLIC BLOOD PRESSURE: 66 MMHG

## 2021-09-22 VITALS — SYSTOLIC BLOOD PRESSURE: 116 MMHG | DIASTOLIC BLOOD PRESSURE: 63 MMHG

## 2021-09-22 VITALS — DIASTOLIC BLOOD PRESSURE: 81 MMHG | SYSTOLIC BLOOD PRESSURE: 142 MMHG

## 2021-09-22 VITALS — SYSTOLIC BLOOD PRESSURE: 136 MMHG | DIASTOLIC BLOOD PRESSURE: 75 MMHG

## 2021-09-22 VITALS — DIASTOLIC BLOOD PRESSURE: 69 MMHG | SYSTOLIC BLOOD PRESSURE: 112 MMHG

## 2021-09-22 VITALS — DIASTOLIC BLOOD PRESSURE: 80 MMHG | SYSTOLIC BLOOD PRESSURE: 132 MMHG

## 2021-09-22 VITALS — SYSTOLIC BLOOD PRESSURE: 121 MMHG | DIASTOLIC BLOOD PRESSURE: 72 MMHG

## 2021-09-22 VITALS — DIASTOLIC BLOOD PRESSURE: 62 MMHG | SYSTOLIC BLOOD PRESSURE: 100 MMHG

## 2021-09-22 VITALS — DIASTOLIC BLOOD PRESSURE: 81 MMHG | SYSTOLIC BLOOD PRESSURE: 128 MMHG

## 2021-09-22 VITALS — DIASTOLIC BLOOD PRESSURE: 67 MMHG | SYSTOLIC BLOOD PRESSURE: 120 MMHG

## 2021-09-22 VITALS — SYSTOLIC BLOOD PRESSURE: 118 MMHG | DIASTOLIC BLOOD PRESSURE: 73 MMHG

## 2021-09-22 VITALS — SYSTOLIC BLOOD PRESSURE: 126 MMHG | DIASTOLIC BLOOD PRESSURE: 74 MMHG

## 2021-09-22 VITALS — DIASTOLIC BLOOD PRESSURE: 64 MMHG | SYSTOLIC BLOOD PRESSURE: 121 MMHG

## 2021-09-22 VITALS — SYSTOLIC BLOOD PRESSURE: 123 MMHG | DIASTOLIC BLOOD PRESSURE: 83 MMHG

## 2021-09-22 VITALS — SYSTOLIC BLOOD PRESSURE: 109 MMHG | DIASTOLIC BLOOD PRESSURE: 65 MMHG

## 2021-09-22 VITALS — DIASTOLIC BLOOD PRESSURE: 61 MMHG | SYSTOLIC BLOOD PRESSURE: 107 MMHG

## 2021-09-22 VITALS — SYSTOLIC BLOOD PRESSURE: 110 MMHG | DIASTOLIC BLOOD PRESSURE: 63 MMHG

## 2021-09-22 VITALS — DIASTOLIC BLOOD PRESSURE: 74 MMHG | SYSTOLIC BLOOD PRESSURE: 121 MMHG

## 2021-09-22 VITALS — SYSTOLIC BLOOD PRESSURE: 107 MMHG | DIASTOLIC BLOOD PRESSURE: 58 MMHG

## 2021-09-22 VITALS — SYSTOLIC BLOOD PRESSURE: 162 MMHG | DIASTOLIC BLOOD PRESSURE: 85 MMHG

## 2021-09-22 VITALS — DIASTOLIC BLOOD PRESSURE: 67 MMHG | SYSTOLIC BLOOD PRESSURE: 136 MMHG

## 2021-09-22 VITALS — DIASTOLIC BLOOD PRESSURE: 68 MMHG | SYSTOLIC BLOOD PRESSURE: 120 MMHG

## 2021-09-22 VITALS — DIASTOLIC BLOOD PRESSURE: 71 MMHG | SYSTOLIC BLOOD PRESSURE: 114 MMHG

## 2021-09-22 VITALS — SYSTOLIC BLOOD PRESSURE: 112 MMHG | DIASTOLIC BLOOD PRESSURE: 67 MMHG

## 2021-09-22 LAB
BASE EXCESS BLDA CALC-SCNC: 2.7 MMOL/L
BUN SERPL-MCNC: 25 MG/DL (ref 7–18)
CALCIUM SERPL-MCNC: 8.7 MG/DL (ref 8.5–10.1)
CHLORIDE SERPL-SCNC: 102 MMOL/L (ref 98–107)
CO2 SERPL-SCNC: 30 MMOL/L (ref 21–32)
CREAT SERPL-MCNC: 1.5 MG/DL (ref 0.6–1.3)
DO-HGB MFR BLDA: 158.3 MMHG
GLUCOSE SERPL-MCNC: 144 MG/DL (ref 74–106)
INHALED O2 CONCENTRATION: 40 %
MAGNESIUM SERPL-MCNC: 2.5 MG/DL (ref 1.8–2.4)
PCO2 TEMP ADJ BLDA: 38 MMHG (ref 35–45)
PH TEMP ADJ BLDA: 7.46 [PH] (ref 7.35–7.45)
PHOSPHATE SERPL-MCNC: 4.8 MG/DL (ref 2.5–4.9)
PO2 TEMP ADJ BLDA: 83.2 MMHG (ref 75–100)
POTASSIUM SERPL-SCNC: 3.8 MMOL/L (ref 3.5–5.1)
SAO2 % BLDA: 97 % (ref 92–98.5)
SODIUM SERPL-SCNC: 142 MMOL/L (ref 136–145)
VENTILATION MODE VENT: (no result)

## 2021-09-22 RX ADMIN — GABAPENTIN SCH MG: 100 CAPSULE ORAL at 17:34

## 2021-09-22 RX ADMIN — PIPERACILLIN SODIUM AND TAZOBACTAM SODIUM SCH MLS/HR: .375; 3 INJECTION, POWDER, LYOPHILIZED, FOR SOLUTION INTRAVENOUS at 04:03

## 2021-09-22 RX ADMIN — FAMOTIDINE SCH MG: 20 TABLET, FILM COATED ORAL at 17:34

## 2021-09-22 RX ADMIN — METOPROLOL TARTRATE SCH MG: 50 TABLET, FILM COATED ORAL at 17:00

## 2021-09-22 RX ADMIN — GABAPENTIN SCH MG: 100 CAPSULE ORAL at 12:41

## 2021-09-22 RX ADMIN — FAMOTIDINE SCH MG: 20 TABLET, FILM COATED ORAL at 08:54

## 2021-09-22 RX ADMIN — Medication SCH MG: at 21:43

## 2021-09-22 RX ADMIN — FLUVOXAMINE MALEATE SCH MG: 50 TABLET, FILM COATED ORAL at 21:43

## 2021-09-22 RX ADMIN — PIPERACILLIN SODIUM AND TAZOBACTAM SODIUM SCH MLS/HR: .375; 3 INJECTION, POWDER, LYOPHILIZED, FOR SOLUTION INTRAVENOUS at 19:27

## 2021-09-22 RX ADMIN — PIPERACILLIN SODIUM AND TAZOBACTAM SODIUM SCH MLS/HR: .375; 3 INJECTION, POWDER, LYOPHILIZED, FOR SOLUTION INTRAVENOUS at 12:41

## 2021-09-22 RX ADMIN — PROPOFOL PRN MLS/HR: 10 INJECTION, EMULSION INTRAVENOUS at 06:20

## 2021-09-22 RX ADMIN — ATORVASTATIN CALCIUM SCH MG: 40 TABLET, FILM COATED ORAL at 21:43

## 2021-09-22 RX ADMIN — METOPROLOL TARTRATE SCH MG: 50 TABLET, FILM COATED ORAL at 08:37

## 2021-09-22 RX ADMIN — DEXTROSE MONOHYDRATE SCH MLS/HR: 50 INJECTION, SOLUTION INTRAVENOUS at 09:00

## 2021-09-22 RX ADMIN — Medication SCH MG: at 08:54

## 2021-09-22 RX ADMIN — FERROUS SULFATE TAB 325 MG (65 MG ELEMENTAL FE) SCH MG: 325 (65 FE) TAB at 08:54

## 2021-09-22 RX ADMIN — PROPOFOL PRN MLS/HR: 10 INJECTION, EMULSION INTRAVENOUS at 02:03

## 2021-09-22 RX ADMIN — ENOXAPARIN SODIUM SCH MG: 40 INJECTION SUBCUTANEOUS at 12:43

## 2021-09-22 RX ADMIN — Medication SCH MG: at 17:34

## 2021-09-22 RX ADMIN — GABAPENTIN SCH MG: 100 CAPSULE ORAL at 08:54

## 2021-09-22 RX ADMIN — SODIUM CHLORIDE PRN MLS/HR: 9 INJECTION, SOLUTION INTRAVENOUS at 12:58

## 2021-09-22 RX ADMIN — TAMSULOSIN HYDROCHLORIDE SCH MG: 0.4 CAPSULE ORAL at 17:34

## 2021-09-22 NOTE — NUR
Received order from dr metzger to completely stop propofol and not to titrate it down. 
Propofol stopped at 30mcg/kg/min. Patient monitored closely. Patient is also off of levo. BP 
holding up well.

## 2021-09-22 NOTE — NUR
RN OPENING NOTES

Patient received sedated on diprivan  running at 40 mcg/kg/min and levophed running at 0.01 
mcg to right upper arm midline. No s/s of respiratory distress. Patient is noted with left 
ac iv access saline lock. Bilateral wrist restraints noted and no s/s of skin breakdown. 
Orogastric tube clamped and patient remains NPO. HOB kept elevated. Velazquez intact and hanging 
to gravity with clear yellow urine noted. Colostomy bag noted to right lower abdomen. Will 
continue to monitor. Call light with in reach.

## 2021-09-22 NOTE — NUR
RN OPENING NOTES:



RECEIVED PT A/OX0 IN BED RESTING COMFORTABLY. PATIENT IN NO S/SX OF ACUTE DISTRESS AT THIS 
TIME. NO SOB NOTED. PATIENT'S BREATHING IS EVEN AND UNLABORED. PATIENT ON MECHANICAL VENT; 
SIMV SETTINGS AS PRESCRIBED; PT TOLERATED WELL. AMBU BAG AT BED SIDE ALARMS SET PER PROTOCOL 
AND AUDIBLE. VENT PLUGGED IN TO RED OUTLET. NO DISTRESS NOTED. PATIENT ON TELE MONITORING 
READING SINUS TACH HR IS @100s AT THE TIME OF RECEIVED. PT CURRENTLY ON NPO; WITH OGT INTACT 
AND IN PLACE; PLACEMENT VERIFIED VIA AUSCULTATION; CLAMPED AT THIS TIME.  

NOTED IV SITE ON R UA MIDLINE #18 AND R FA#20 ;BOTH  PATENT, INTACT AND FLUSHING WELL; NO 
S/S OF INFECTION OR INFILTRATION. PT ALSO HAS ALL SECURED AND INTACT NO SIGNS OF INFECTION. 
WITH IV FLUID RUNNING AS ORDERED. PT HAS COLOSTOMY AT R LOWER ABDOMEN NO OUTPUT NOTED UPON 
RECEIVED. WITH ANDERS CATH IN PLACE, MODERATE URINE OUTPUT NOTED.  WITH BILATERAL SOFT 
RESTRAINTS IN PLACED, MONITORED AND ASSESSED PER PROTOCOL. 

SAFETY MEASURES HAVE BEEN PROVIDED AND IMPLEMENTED. PATIENT BED ALARM IS ON. HEAD OF BED 
ELEVATED. BED IS LOCKED,  IN LOWEST POSITION AND SIDE RAILS UP. CALL LIGHT WITHIN REACH OF 
THE PATIENT. APPLICABLE ISOLATION PRECAUTIONS IN PLACE. WILL CONTINUE TO MONITOR AND 
REASSESS FOR ANY CHANGES AND WILL CARRY OUT ANY ONGOING AND ACTIVE MD ORDER.

## 2021-09-22 NOTE — NUR
rt drawing abg at bedside and patient on SIMV SETTING OF 4 AND PRESSURE SUPPORT OF 15. 
PATIENT TOLERATING WELL AND 02 OF 97% AND VITALS WNL. OFF OF LEVO AND DIPRIVAN DRIPS.

## 2021-09-22 NOTE — NUR
WOUND CARE CONSULT: PT PRESENTS WITH SACRAL INTACT DEEP TISSUE INJURY, PRESENT ON ADMISSION. 
COLOSTOMY NOTED. RECOMMENDATIONS MADE FOR WOUND CARE AND SKIN PROTECTION. DISCUSSED WITH 
NURSING STAFF. PT IS ON ZHAO ISOFLEX LOW AIRLOSS BED. PT CURRENTLY INTUBATED. MD IN 
AGREEMENT WITH PLAN OF CARE. 

-------------------------------------------------------------------------------

Addendum: 09/22/21 at 0735 by NEY MARIE WNDNU

-------------------------------------------------------------------------------

Amended: Links added.

## 2021-09-22 NOTE — NUR
RN NOTE 



PATIENT NOW NEGATIVE FOR COVID, ISOLATION REMOVED. PATIENT IN BED. SEDATED. ON MECHANICAL 
VENT:NO CHANGES IN SETTINGS. NO RESP DISTRESS. NO S/S PAIN. TELE MONITOR READS SINUS RHYTHM/ 
SINUS TACHYCARDIA. NO DISTRESS. IV IN ANTONIETTA MIDLINE RUNNING PROPOFOL @40 AND LEVO @0.01. 
RAC#20 AND LAC#20 PATENT AND SALINE LOCKED. ANDERS CATHETER OUTPUT 300CC AND YELLOW. 
COLOSTOMY BAG OUTPUT 160CC BROWN/ GREEN. OG TUBE REMAINS CLAMPED. BED REMAINS LOW AND 
LOCKED, HOB ELEVATED IN SEMI FOWLERS, SIDE RAILS UP X2, WILL ENDORSE TO ONCOMING SHIFT.

## 2021-09-22 NOTE — NUR
RN CLOSING NOTES

Patient resting comfortably in bed on SIMV SETTING, roberta well. No c/o pain or discomfort. 
Patient does not show any No s/s of respiratory distress. Patient is noted with right upper 
arm picc line running normal saline 50 cc/hour. Bilateral wrist restraints noted and no s/s 
of skin breakdown. Orogastric tube clamped and patient remains NPO. HOB kept elevated. Velazquez 
intact and hanging to gravity with clear yellow urine noted with output of 350 cc. Colostomy 
bag noted to right lower abdomen with 100  cc. Colostomy changed and care provided. Will 
continue to monitor. Call light with in reach.Endorsed to next shift for dereck. Patient's temp 
noted to be 100.1, cooling measures initiated and endorsed to monitor patient.

## 2021-09-23 VITALS — DIASTOLIC BLOOD PRESSURE: 61 MMHG | SYSTOLIC BLOOD PRESSURE: 121 MMHG

## 2021-09-23 VITALS — SYSTOLIC BLOOD PRESSURE: 91 MMHG | DIASTOLIC BLOOD PRESSURE: 52 MMHG

## 2021-09-23 VITALS — DIASTOLIC BLOOD PRESSURE: 58 MMHG | SYSTOLIC BLOOD PRESSURE: 97 MMHG

## 2021-09-23 VITALS — SYSTOLIC BLOOD PRESSURE: 115 MMHG | DIASTOLIC BLOOD PRESSURE: 73 MMHG

## 2021-09-23 VITALS — SYSTOLIC BLOOD PRESSURE: 108 MMHG | DIASTOLIC BLOOD PRESSURE: 68 MMHG

## 2021-09-23 VITALS — DIASTOLIC BLOOD PRESSURE: 65 MMHG | SYSTOLIC BLOOD PRESSURE: 124 MMHG

## 2021-09-23 VITALS — SYSTOLIC BLOOD PRESSURE: 105 MMHG | DIASTOLIC BLOOD PRESSURE: 63 MMHG

## 2021-09-23 VITALS — DIASTOLIC BLOOD PRESSURE: 72 MMHG | SYSTOLIC BLOOD PRESSURE: 118 MMHG

## 2021-09-23 VITALS — SYSTOLIC BLOOD PRESSURE: 119 MMHG | DIASTOLIC BLOOD PRESSURE: 64 MMHG

## 2021-09-23 VITALS — SYSTOLIC BLOOD PRESSURE: 123 MMHG | DIASTOLIC BLOOD PRESSURE: 74 MMHG

## 2021-09-23 VITALS — DIASTOLIC BLOOD PRESSURE: 61 MMHG | SYSTOLIC BLOOD PRESSURE: 101 MMHG

## 2021-09-23 VITALS — DIASTOLIC BLOOD PRESSURE: 70 MMHG | SYSTOLIC BLOOD PRESSURE: 127 MMHG

## 2021-09-23 VITALS — SYSTOLIC BLOOD PRESSURE: 117 MMHG | DIASTOLIC BLOOD PRESSURE: 70 MMHG

## 2021-09-23 VITALS — SYSTOLIC BLOOD PRESSURE: 120 MMHG | DIASTOLIC BLOOD PRESSURE: 72 MMHG

## 2021-09-23 VITALS — SYSTOLIC BLOOD PRESSURE: 132 MMHG | DIASTOLIC BLOOD PRESSURE: 75 MMHG

## 2021-09-23 VITALS — SYSTOLIC BLOOD PRESSURE: 133 MMHG | DIASTOLIC BLOOD PRESSURE: 80 MMHG

## 2021-09-23 VITALS — SYSTOLIC BLOOD PRESSURE: 100 MMHG | DIASTOLIC BLOOD PRESSURE: 62 MMHG

## 2021-09-23 VITALS — DIASTOLIC BLOOD PRESSURE: 58 MMHG | SYSTOLIC BLOOD PRESSURE: 93 MMHG

## 2021-09-23 VITALS — SYSTOLIC BLOOD PRESSURE: 110 MMHG | DIASTOLIC BLOOD PRESSURE: 68 MMHG

## 2021-09-23 VITALS — DIASTOLIC BLOOD PRESSURE: 63 MMHG | SYSTOLIC BLOOD PRESSURE: 111 MMHG

## 2021-09-23 VITALS — DIASTOLIC BLOOD PRESSURE: 84 MMHG | SYSTOLIC BLOOD PRESSURE: 144 MMHG

## 2021-09-23 VITALS — SYSTOLIC BLOOD PRESSURE: 111 MMHG | DIASTOLIC BLOOD PRESSURE: 65 MMHG

## 2021-09-23 VITALS — DIASTOLIC BLOOD PRESSURE: 65 MMHG | SYSTOLIC BLOOD PRESSURE: 111 MMHG

## 2021-09-23 VITALS — DIASTOLIC BLOOD PRESSURE: 80 MMHG | SYSTOLIC BLOOD PRESSURE: 136 MMHG

## 2021-09-23 VITALS — SYSTOLIC BLOOD PRESSURE: 123 MMHG | DIASTOLIC BLOOD PRESSURE: 59 MMHG

## 2021-09-23 VITALS — DIASTOLIC BLOOD PRESSURE: 65 MMHG | SYSTOLIC BLOOD PRESSURE: 109 MMHG

## 2021-09-23 VITALS — DIASTOLIC BLOOD PRESSURE: 71 MMHG | SYSTOLIC BLOOD PRESSURE: 127 MMHG

## 2021-09-23 VITALS — SYSTOLIC BLOOD PRESSURE: 141 MMHG | DIASTOLIC BLOOD PRESSURE: 72 MMHG

## 2021-09-23 VITALS — DIASTOLIC BLOOD PRESSURE: 78 MMHG | SYSTOLIC BLOOD PRESSURE: 123 MMHG

## 2021-09-23 VITALS — DIASTOLIC BLOOD PRESSURE: 64 MMHG | SYSTOLIC BLOOD PRESSURE: 122 MMHG

## 2021-09-23 VITALS — SYSTOLIC BLOOD PRESSURE: 116 MMHG | DIASTOLIC BLOOD PRESSURE: 74 MMHG

## 2021-09-23 VITALS — DIASTOLIC BLOOD PRESSURE: 57 MMHG | SYSTOLIC BLOOD PRESSURE: 101 MMHG

## 2021-09-23 LAB
BASE EXCESS BLDA CALC-SCNC: -1.1 MMOL/L
BASE EXCESS BLDA CALC-SCNC: 0.9 MMOL/L
BUN SERPL-MCNC: 22 MG/DL (ref 7–18)
CALCIUM SERPL-MCNC: 8.2 MG/DL (ref 8.5–10.1)
CHLORIDE SERPL-SCNC: 102 MMOL/L (ref 98–107)
CO2 SERPL-SCNC: 28 MMOL/L (ref 21–32)
CREAT SERPL-MCNC: 1.2 MG/DL (ref 0.6–1.3)
DO-HGB MFR BLDA: 133 MMHG
DO-HGB MFR BLDA: 158.1 MMHG
GLUCOSE SERPL-MCNC: 164 MG/DL (ref 74–106)
INHALED O2 CONCENTRATION: 40 %
INHALED O2 CONCENTRATION: 40 %
INTRINSIC PEEP RESPIRATORY: 5 CM H2O
PCO2 TEMP ADJ BLDA: 39.9 MMHG (ref 35–45)
PCO2 TEMP ADJ BLDA: 40.6 MMHG (ref 35–45)
PEEP SETTING VENT: 400 ML
PH TEMP ADJ BLDA: 7.39 [PH] (ref 7.35–7.45)
PH TEMP ADJ BLDA: 7.42 [PH] (ref 7.35–7.45)
PO2 TEMP ADJ BLDA: 105.5 MMHG (ref 75–100)
PO2 TEMP ADJ BLDA: 81.2 MMHG (ref 75–100)
POTASSIUM SERPL-SCNC: 2.9 MMOL/L (ref 3.5–5.1)
SAO2 % BLDA: 95.6 % (ref 92–98.5)
SAO2 % BLDA: 97.8 % (ref 92–98.5)
SET RATE, BG: 4
SODIUM SERPL-SCNC: 142 MMOL/L (ref 136–145)
VENTILATION MODE VENT: (no result)
VENTILATION MODE VENT: (no result)

## 2021-09-23 RX ADMIN — POTASSIUM CHLORIDE SCH MLS/HR: 200 INJECTION, SOLUTION INTRAVENOUS at 14:48

## 2021-09-23 RX ADMIN — PIPERACILLIN SODIUM AND TAZOBACTAM SODIUM SCH MLS/HR: .375; 3 INJECTION, POWDER, LYOPHILIZED, FOR SOLUTION INTRAVENOUS at 03:38

## 2021-09-23 RX ADMIN — ATORVASTATIN CALCIUM SCH MG: 40 TABLET, FILM COATED ORAL at 21:32

## 2021-09-23 RX ADMIN — Medication SCH MG: at 11:26

## 2021-09-23 RX ADMIN — ALBUTEROL SULFATE SCH MG: 1.25 SOLUTION RESPIRATORY (INHALATION) at 11:26

## 2021-09-23 RX ADMIN — PIPERACILLIN SODIUM AND TAZOBACTAM SODIUM SCH MLS/HR: .375; 3 INJECTION, POWDER, LYOPHILIZED, FOR SOLUTION INTRAVENOUS at 12:33

## 2021-09-23 RX ADMIN — FAMOTIDINE SCH MG: 20 TABLET, FILM COATED ORAL at 17:08

## 2021-09-23 RX ADMIN — Medication SCH MG: at 08:56

## 2021-09-23 RX ADMIN — METOPROLOL TARTRATE SCH MG: 50 TABLET, FILM COATED ORAL at 08:55

## 2021-09-23 RX ADMIN — POTASSIUM CHLORIDE SCH MLS/HR: 200 INJECTION, SOLUTION INTRAVENOUS at 13:30

## 2021-09-23 RX ADMIN — Medication SCH MG: at 19:21

## 2021-09-23 RX ADMIN — Medication SCH MG: at 21:32

## 2021-09-23 RX ADMIN — FAMOTIDINE SCH MG: 20 TABLET, FILM COATED ORAL at 08:55

## 2021-09-23 RX ADMIN — FLUVOXAMINE MALEATE SCH MG: 50 TABLET, FILM COATED ORAL at 21:32

## 2021-09-23 RX ADMIN — SODIUM CHLORIDE PRN MLS/HR: 9 INJECTION, SOLUTION INTRAVENOUS at 06:01

## 2021-09-23 RX ADMIN — GABAPENTIN SCH MG: 100 CAPSULE ORAL at 08:55

## 2021-09-23 RX ADMIN — POTASSIUM CHLORIDE SCH MLS/HR: 200 INJECTION, SOLUTION INTRAVENOUS at 10:57

## 2021-09-23 RX ADMIN — METOPROLOL TARTRATE SCH MG: 50 TABLET, FILM COATED ORAL at 17:09

## 2021-09-23 RX ADMIN — TAMSULOSIN HYDROCHLORIDE SCH MG: 0.4 CAPSULE ORAL at 17:09

## 2021-09-23 RX ADMIN — Medication SCH MG: at 14:42

## 2021-09-23 RX ADMIN — Medication SCH MG: at 17:09

## 2021-09-23 RX ADMIN — PIPERACILLIN SODIUM AND TAZOBACTAM SODIUM SCH MLS/HR: .375; 3 INJECTION, POWDER, LYOPHILIZED, FOR SOLUTION INTRAVENOUS at 19:36

## 2021-09-23 RX ADMIN — DEXTROSE MONOHYDRATE SCH MLS/HR: 50 INJECTION, SOLUTION INTRAVENOUS at 08:54

## 2021-09-23 RX ADMIN — GABAPENTIN SCH MG: 100 CAPSULE ORAL at 17:09

## 2021-09-23 RX ADMIN — TOBRAMYCIN AND DEXAMETHASONE SCH ML: 3; 1 SUSPENSION/ DROPS OPHTHALMIC at 19:36

## 2021-09-23 RX ADMIN — POTASSIUM CHLORIDE SCH MLS/HR: 200 INJECTION, SOLUTION INTRAVENOUS at 12:26

## 2021-09-23 RX ADMIN — ALBUTEROL SULFATE SCH MG: 1.25 SOLUTION RESPIRATORY (INHALATION) at 19:21

## 2021-09-23 RX ADMIN — TOBRAMYCIN AND DEXAMETHASONE SCH ML: 3; 1 SUSPENSION/ DROPS OPHTHALMIC at 17:17

## 2021-09-23 RX ADMIN — POTASSIUM CHLORIDE SCH MLS/HR: 200 INJECTION, SOLUTION INTRAVENOUS at 17:10

## 2021-09-23 RX ADMIN — ENOXAPARIN SODIUM SCH MG: 40 INJECTION SUBCUTANEOUS at 12:34

## 2021-09-23 RX ADMIN — Medication SCH MG: at 23:28

## 2021-09-23 RX ADMIN — POTASSIUM CHLORIDE SCH MLS/HR: 200 INJECTION, SOLUTION INTRAVENOUS at 16:13

## 2021-09-23 RX ADMIN — GABAPENTIN SCH MG: 100 CAPSULE ORAL at 12:33

## 2021-09-23 RX ADMIN — ACETAMINOPHEN PRN MG: 325 TABLET ORAL at 19:35

## 2021-09-23 RX ADMIN — ALBUTEROL SULFATE SCH MG: 1.25 SOLUTION RESPIRATORY (INHALATION) at 14:42

## 2021-09-23 RX ADMIN — ALBUTEROL SULFATE SCH MG: 1.25 SOLUTION RESPIRATORY (INHALATION) at 23:28

## 2021-09-23 RX ADMIN — FERROUS SULFATE TAB 325 MG (65 MG ELEMENTAL FE) SCH MG: 325 (65 FE) TAB at 08:55

## 2021-09-23 NOTE — NUR
RN CLOSING NOTE: 



PATIENT REMAINS IN ROOM IN NO SIGNS OF RESPIRATORY DISTRESS, PATIENT STILL ON VENT; SIMV 
SETTINGS AS PRESCRIBED;TOLERATING WELL SATURATING @ >95% SP02. SAFETY MEASURES IMPLEMENTED, 
BED IN LOWEST POSITION, LOCKED, SIDE RAILS UP, CALL LIGHT WITHIN REACH. ALL NEEDS AND ORDERS 
ADDRESSED DURING THE SHIFT. IV ACCESS MAINTAINED INTACT, SECURED AND FLUSHING WELL.  ALL DUE 
MEDS GIVEN AS ORDERED & SCHEDULED ; PATIENT TOLERATED WELL WITH IV FLUID OF NS@50ML/HR. 
PATIENT KEPT CLEAN AND COMFORTABLE WITHIN THE SHIFT. PATIENT ENDORSED TO INCOMING SHIFT RN 
WITH STABLE VITAL SIGN AND FOR CONTINUITY OF CARE.

## 2021-09-23 NOTE — NUR
RN NOTES



NO NOTED CHANGES IN PATIENT CONDITION AT THIS TIME; PATIENT VITALS STABLE, NO SIGNS OF ACUTE 
RESPIRATORY DISTRESS. AM PATIENT CARE RENDERED.  WILL CONTINUE TO MONITOR AND REASSESS FOR 
ANY CHANGES THROUGHOUT THE SHIFT.

## 2021-09-23 NOTE — NUR
RN OPENING NOTES:



RECEIVED PT A/OX3-4 IN BED RESTING COMFORTABLY. PATIENT IN NO S/SX OF ACUTE DISTRESS AT THIS 
TIME. NO SOB NOTED. PATIENT'S BREATHING IS EVEN AND UNLABORED. PATIENT IS ON 5L OF OXYGEN 
VIA NC; TOLERATING WELL. PATIENT ON TELE MONITORING READING SINUS RHYTHM HR IS @70s AT THE 
TIME OF RECEIVED. PT CURRENTLY ON SOFT DIET.  NOTED IV SITE ON R UA MIDLINE #18 AND R FA#20 
;BOTH  PATENT, INTACT AND FLUSHING WELL; NO S/S OF INFECTION OR INFILTRATION. WITH IV FLUID 
RUNNING AS ORDERED. PT HAS COLOSTOMY AT R LOWER ABDOMEN IN PLACE AND INTACT. WITH ANDERS CATH 
IN PLACE, MINIMAL URINE OUTPUT NOTED AT THIS TIME. SAFETY MEASURES HAVE BEEN PROVIDED AND 
IMPLEMENTED. PATIENT BED ALARM IS ON. HEAD OF BED ELEVATED. BED IS LOCKED,  IN LOWEST 
POSITION AND SIDE RAILS UP. CALL LIGHT WITHIN REACH OF THE PATIENT. APPLICABLE ISOLATION 
PRECAUTIONS IN PLACE. WILL CONTINUE TO MONITOR AND REASSESS FOR ANY CHANGES AND WILL CARRY 
OUT ANY ONGOING AND ACTIVE MD ORDER.

## 2021-09-23 NOTE — NUR
RT NOTE



PATIENT EXTUBATED PER MD ORDER. PT IS AWAKE AND VERBALLY RESPONSIVE. PLACED PATIENT ON 5LPM 
VIA NC. SpO2 96%, HR 85, RR 20. NO SIGNS OF DISTRESS NOTED AT THIS TIME. WILL CONTINUE TO 
MONITOR CLOSELY THROUGHOUT SHIFT. ALLAN ESPANA AND CHARGE NURSE CELINA MULTANI.

## 2021-09-23 NOTE — NUR
PT'S GRANDDAUGHTERS AT BEDSIDE.  THEY STATED THE NURSING HOME SENT PT'S CELL PHONE WITH 
PATIENT.  NOTHING WAS LISTED ON THE BELONGINGS LIST.  PT'S GRANDDAUGHTER HAS THE FIND YOUR 
IPHONE SAURAV AND SAID THE PHONE WAS HERE.  SHE CALLED THE PHONE.  RN AND GRANDDAUGHTERS 
FOLLOWED THE VIBRATION SOUNDS AND FOUND THE PHONE AND WATCH IN BEDSIDE TABLE.  BELONGINGS 
LIST UPDATED WITH PHONE AT BEDSIDE.  PT'S WATCH IS BEING SENT HOME WITH GRANDDAUGHTER, ALSO 
DOCUMENTED.  PT AND FAMILY PLEASED PHONE AND WATCH WERE FOUND.

## 2021-09-23 NOTE — NUR
END OF SHIFT NOTE:  PT WAS EXTUBATED TODAY AT 0916 WITHOUT DIFFICULTY.  PT STARTED TALKING 
WITHOUT DIFFICULTY AS SOON AS HE WAS EXTUBATED.  PT ON 5L NC.  PT HAS PRODUCTIVE COUGH.  OG 
TUBE REMOVED WHEN PATIENT WAS EXTUBATED.  BEDSIDE SWALLOW EVAL DONE AT 1300, PT PASSED 
WITHOUT DIFFICULTY.  SOFT DIET ORDERED FOR PATIENT.  RESTRAINTS DC'D AFTER EXTUBATION.  PT'S 
POTASSIUM WAS 2.9 THIS AM.  60MEQ OF POTASSIUM GIVEN THIS SHIFT IV PER MD ORDERS (40MEQ FROM 
DR. LIMON AND 20 MEQ FROM DR. MCLAIN).  PT STATED HE IS NOT ALLERGIC TO TYLENOL, STATES HE 
TAKES TYLENOL EVERY DAY.  ALLERGIES UPDATED TO REMOVE TYLENOL.  PT ALERT OX3 ALL SHIFT, PT 
COOPERATIVE.  PT CHECKED ON HOURLY AND PRN BY NURSING STAFF.

## 2021-09-23 NOTE — NUR
RN NOTES



PATIENT REMAINED TO BE IN NO SIGNS OF ACUTE RESPIRATORY DISTRESS , VITAL SIGNS WNL AT THIS 
TIME.WILL CONTINUE TO MONITOR AND REASSESS FOR ANY CHANGES THROUGHOUT THE SHIFT.

## 2021-09-23 NOTE — NUR
RN NOTES



PT REQUESTING FOR ANTACIDS; NOTIFIED ONCHELDER MONTALVO. SECURED ORDER FOR MAALOX 15CC Q6H PRN. WILL 
CARRY OUT. CHARGE RN MADE AWARE.

## 2021-09-23 NOTE — NUR
OPENING NOTE:  REPORT RECEIVED FROM THERESA LEMUS.  PT ON SIMV SINCE YESTERDAY, NO SEDATION.  
PT AWAKE AND FAIRLY COOPERATIVE, RESTRAINTS IN PLACE WITH CURRENT ORDER.  PER REPORT COPIOUS 
ORAL SECRETIONS NOTED.  PT CHECKED ON HOURLY AND PRN BY NURSING STAFF.

## 2021-09-24 VITALS — DIASTOLIC BLOOD PRESSURE: 64 MMHG | SYSTOLIC BLOOD PRESSURE: 115 MMHG

## 2021-09-24 VITALS — SYSTOLIC BLOOD PRESSURE: 93 MMHG | DIASTOLIC BLOOD PRESSURE: 54 MMHG

## 2021-09-24 VITALS — SYSTOLIC BLOOD PRESSURE: 133 MMHG | DIASTOLIC BLOOD PRESSURE: 73 MMHG

## 2021-09-24 VITALS — SYSTOLIC BLOOD PRESSURE: 104 MMHG | DIASTOLIC BLOOD PRESSURE: 66 MMHG

## 2021-09-24 VITALS — SYSTOLIC BLOOD PRESSURE: 121 MMHG | DIASTOLIC BLOOD PRESSURE: 69 MMHG

## 2021-09-24 VITALS — SYSTOLIC BLOOD PRESSURE: 101 MMHG | DIASTOLIC BLOOD PRESSURE: 58 MMHG

## 2021-09-24 VITALS — SYSTOLIC BLOOD PRESSURE: 108 MMHG | DIASTOLIC BLOOD PRESSURE: 61 MMHG

## 2021-09-24 VITALS — SYSTOLIC BLOOD PRESSURE: 104 MMHG | DIASTOLIC BLOOD PRESSURE: 73 MMHG

## 2021-09-24 VITALS — DIASTOLIC BLOOD PRESSURE: 67 MMHG | SYSTOLIC BLOOD PRESSURE: 106 MMHG

## 2021-09-24 VITALS — SYSTOLIC BLOOD PRESSURE: 103 MMHG | DIASTOLIC BLOOD PRESSURE: 56 MMHG

## 2021-09-24 VITALS — DIASTOLIC BLOOD PRESSURE: 78 MMHG | SYSTOLIC BLOOD PRESSURE: 100 MMHG

## 2021-09-24 VITALS — SYSTOLIC BLOOD PRESSURE: 103 MMHG | DIASTOLIC BLOOD PRESSURE: 51 MMHG

## 2021-09-24 VITALS — DIASTOLIC BLOOD PRESSURE: 67 MMHG | SYSTOLIC BLOOD PRESSURE: 115 MMHG

## 2021-09-24 VITALS — DIASTOLIC BLOOD PRESSURE: 53 MMHG | SYSTOLIC BLOOD PRESSURE: 103 MMHG

## 2021-09-24 VITALS — DIASTOLIC BLOOD PRESSURE: 61 MMHG | SYSTOLIC BLOOD PRESSURE: 110 MMHG

## 2021-09-24 VITALS — DIASTOLIC BLOOD PRESSURE: 71 MMHG | SYSTOLIC BLOOD PRESSURE: 118 MMHG

## 2021-09-24 VITALS — DIASTOLIC BLOOD PRESSURE: 66 MMHG | SYSTOLIC BLOOD PRESSURE: 133 MMHG

## 2021-09-24 VITALS — DIASTOLIC BLOOD PRESSURE: 55 MMHG | SYSTOLIC BLOOD PRESSURE: 100 MMHG

## 2021-09-24 VITALS — DIASTOLIC BLOOD PRESSURE: 68 MMHG | SYSTOLIC BLOOD PRESSURE: 110 MMHG

## 2021-09-24 VITALS — DIASTOLIC BLOOD PRESSURE: 59 MMHG | SYSTOLIC BLOOD PRESSURE: 105 MMHG

## 2021-09-24 LAB
BASOPHILS # BLD AUTO: 0 K/UL (ref 0–0.2)
BASOPHILS NFR BLD AUTO: 0.6 % (ref 0–2)
BUN SERPL-MCNC: 17 MG/DL (ref 7–18)
BUN SERPL-MCNC: 18 MG/DL (ref 7–18)
CALCIUM SERPL-MCNC: 7.8 MG/DL (ref 8.5–10.1)
CALCIUM SERPL-MCNC: 8.1 MG/DL (ref 8.5–10.1)
CHLORIDE SERPL-SCNC: 107 MMOL/L (ref 98–107)
CHLORIDE SERPL-SCNC: 107 MMOL/L (ref 98–107)
CO2 SERPL-SCNC: 28 MMOL/L (ref 21–32)
CO2 SERPL-SCNC: 33 MMOL/L (ref 21–32)
CREAT SERPL-MCNC: 0.9 MG/DL (ref 0.6–1.3)
CREAT SERPL-MCNC: 0.9 MG/DL (ref 0.6–1.3)
EOSINOPHIL NFR BLD AUTO: 0.7 % (ref 0–6)
GLUCOSE SERPL-MCNC: 99 MG/DL (ref 74–106)
GLUCOSE SERPL-MCNC: 99 MG/DL (ref 74–106)
HCT VFR BLD AUTO: 35 % (ref 39–51)
HGB BLD-MCNC: 11.8 G/DL (ref 13.5–17.5)
LYMPHOCYTES NFR BLD AUTO: 0.4 K/UL (ref 0.8–4.8)
LYMPHOCYTES NFR BLD AUTO: 8.4 % (ref 20–44)
LYMPHOCYTES NFR BLD MANUAL: 7 % (ref 16–48)
MAGNESIUM SERPL-MCNC: 2.1 MG/DL (ref 1.8–2.4)
MCHC RBC AUTO-ENTMCNC: 34 G/DL (ref 31–36)
MCV RBC AUTO: 101 FL (ref 80–96)
MONOCYTES NFR BLD AUTO: 0.7 K/UL (ref 0.1–1.3)
MONOCYTES NFR BLD AUTO: 16.8 % (ref 2–12)
MONOCYTES NFR BLD MANUAL: 15 % (ref 0–11)
NEUTROPHILS # BLD AUTO: 3.1 K/UL (ref 1.8–8.9)
NEUTROPHILS NFR BLD AUTO: 73.5 % (ref 43–81)
NEUTS SEG NFR BLD MANUAL: 78 % (ref 42–76)
PHOSPHATE SERPL-MCNC: 2.8 MG/DL (ref 2.5–4.9)
PLATELET # BLD AUTO: 151 K/UL (ref 150–450)
POTASSIUM SERPL-SCNC: 3.9 MMOL/L (ref 3.5–5.1)
POTASSIUM SERPL-SCNC: 3.9 MMOL/L (ref 3.5–5.1)
RBC # BLD AUTO: 3.48 MIL/UL (ref 4.5–6)
SODIUM SERPL-SCNC: 141 MMOL/L (ref 136–145)
SODIUM SERPL-SCNC: 143 MMOL/L (ref 136–145)
WBC NRBC COR # BLD AUTO: 4.3 K/UL (ref 4.3–11)

## 2021-09-24 RX ADMIN — Medication SCH MG: at 03:22

## 2021-09-24 RX ADMIN — ACETAMINOPHEN PRN MG: 325 TABLET ORAL at 08:49

## 2021-09-24 RX ADMIN — ALBUTEROL SULFATE SCH MG: 1.25 SOLUTION RESPIRATORY (INHALATION) at 15:17

## 2021-09-24 RX ADMIN — ALBUTEROL SULFATE SCH MG: 1.25 SOLUTION RESPIRATORY (INHALATION) at 07:19

## 2021-09-24 RX ADMIN — ALBUTEROL SULFATE SCH MG: 1.25 SOLUTION RESPIRATORY (INHALATION) at 11:31

## 2021-09-24 RX ADMIN — PIPERACILLIN SODIUM AND TAZOBACTAM SODIUM SCH MLS/HR: .375; 3 INJECTION, POWDER, LYOPHILIZED, FOR SOLUTION INTRAVENOUS at 20:06

## 2021-09-24 RX ADMIN — METOPROLOL TARTRATE SCH MG: 50 TABLET, FILM COATED ORAL at 09:54

## 2021-09-24 RX ADMIN — ALBUTEROL SULFATE SCH MG: 1.25 SOLUTION RESPIRATORY (INHALATION) at 03:22

## 2021-09-24 RX ADMIN — GABAPENTIN SCH MG: 100 CAPSULE ORAL at 15:18

## 2021-09-24 RX ADMIN — Medication SCH MG: at 17:57

## 2021-09-24 RX ADMIN — TRAMADOL HYDROCHLORIDE PRN MG: 50 TABLET, FILM COATED ORAL at 22:51

## 2021-09-24 RX ADMIN — PIPERACILLIN SODIUM AND TAZOBACTAM SODIUM SCH MLS/HR: .375; 3 INJECTION, POWDER, LYOPHILIZED, FOR SOLUTION INTRAVENOUS at 03:03

## 2021-09-24 RX ADMIN — GABAPENTIN SCH MG: 100 CAPSULE ORAL at 09:54

## 2021-09-24 RX ADMIN — Medication SCH MG: at 11:31

## 2021-09-24 RX ADMIN — FAMOTIDINE SCH MG: 20 TABLET, FILM COATED ORAL at 09:54

## 2021-09-24 RX ADMIN — ENOXAPARIN SODIUM SCH MG: 40 INJECTION SUBCUTANEOUS at 15:37

## 2021-09-24 RX ADMIN — Medication SCH MG: at 22:51

## 2021-09-24 RX ADMIN — TOBRAMYCIN AND DEXAMETHASONE SCH ML: 3; 1 SUSPENSION/ DROPS OPHTHALMIC at 15:18

## 2021-09-24 RX ADMIN — TRAMADOL HYDROCHLORIDE PRN MG: 50 TABLET, FILM COATED ORAL at 08:49

## 2021-09-24 RX ADMIN — TOBRAMYCIN AND DEXAMETHASONE SCH DROP: 3; 1 SUSPENSION/ DROPS OPHTHALMIC at 20:06

## 2021-09-24 RX ADMIN — TOBRAMYCIN AND DEXAMETHASONE SCH ML: 3; 1 SUSPENSION/ DROPS OPHTHALMIC at 09:55

## 2021-09-24 RX ADMIN — PIPERACILLIN SODIUM AND TAZOBACTAM SODIUM SCH MLS/HR: .375; 3 INJECTION, POWDER, LYOPHILIZED, FOR SOLUTION INTRAVENOUS at 15:18

## 2021-09-24 RX ADMIN — METOPROLOL TARTRATE SCH MG: 50 TABLET, FILM COATED ORAL at 17:58

## 2021-09-24 RX ADMIN — ALBUTEROL SULFATE SCH MG: 1.25 SOLUTION RESPIRATORY (INHALATION) at 23:18

## 2021-09-24 RX ADMIN — Medication SCH MG: at 15:17

## 2021-09-24 RX ADMIN — TAMSULOSIN HYDROCHLORIDE SCH MG: 0.4 CAPSULE ORAL at 17:57

## 2021-09-24 RX ADMIN — FAMOTIDINE SCH MG: 20 TABLET, FILM COATED ORAL at 17:58

## 2021-09-24 RX ADMIN — ATORVASTATIN CALCIUM SCH MG: 40 TABLET, FILM COATED ORAL at 22:51

## 2021-09-24 RX ADMIN — SODIUM CHLORIDE PRN MLS/HR: 9 INJECTION, SOLUTION INTRAVENOUS at 01:30

## 2021-09-24 RX ADMIN — ALBUTEROL SULFATE SCH MG: 1.25 SOLUTION RESPIRATORY (INHALATION) at 20:15

## 2021-09-24 RX ADMIN — FERROUS SULFATE TAB 325 MG (65 MG ELEMENTAL FE) SCH MG: 325 (65 FE) TAB at 09:54

## 2021-09-24 RX ADMIN — TOBRAMYCIN AND DEXAMETHASONE SCH ML: 3; 1 SUSPENSION/ DROPS OPHTHALMIC at 15:30

## 2021-09-24 RX ADMIN — SODIUM CHLORIDE PRN MLS/HR: 9 INJECTION, SOLUTION INTRAVENOUS at 20:47

## 2021-09-24 RX ADMIN — GABAPENTIN SCH MG: 100 CAPSULE ORAL at 17:58

## 2021-09-24 RX ADMIN — Medication SCH MG: at 23:18

## 2021-09-24 RX ADMIN — DEXTROSE MONOHYDRATE SCH MLS/HR: 50 INJECTION, SOLUTION INTRAVENOUS at 09:54

## 2021-09-24 RX ADMIN — Medication SCH MG: at 09:53

## 2021-09-24 RX ADMIN — Medication SCH MG: at 07:19

## 2021-09-24 RX ADMIN — Medication SCH MG: at 20:15

## 2021-09-24 NOTE — NUR
TELE RN NOTE



PATIENT ALERT AND ORIENTED X 3. PATIENT DENIES PAIN AND DISCOMFORT AT THIS TIME.  WITH 
OXYGEN AT 5LPM VIA NASAL CANULA SATURATING AT 96-97%. IV LINE AT RIGHT UPPER ARM MIDLINE 
WITH IVF OF NS AT 50ML/HR. WITH COLOSTOMY BAG ON RIGHT LOWER QUADRANT. WITH ANDERS CATHETER 
TO URINE BAG. SAFETY MEASURES ENSURED WITH BED LOCKED AND AT LOWEST POSITION. CALL LIGHT 
WITHIN REACH AT ALL TIMES. WILL ENDORSE PATIENT FOR CONTINUITY OF CARE.

## 2021-09-24 NOTE — NUR
TELE RN NOTE



PT PULLED OUT ANTONIETTA MIDLINE, MINIMAL BLEEDING NOTED, CATHETER INTACT. RIGHT HAND #22G 
PERIPHERAL IV ACCESS INSERTED

## 2021-09-24 NOTE — NUR
RN CLOSING NOTE: 



PATIENT REMAINS IN ROOM IN NO SIGNS OF RESPIRATORY DISTRESS, PATIENT STILL ON 5L OF 02 VIA 
NC ;TOLERATING WELL SATURATING @ >95% SP02. SAFETY MEASURES IMPLEMENTED, BED IN LOWEST 
POSITION, LOCKED, SIDE RAILS UP, CALL LIGHT WITHIN REACH. ALL NEEDS AND ORDERS ADDRESSED 
DURING THE SHIFT. IV ACCESS MAINTAINED INTACT, SECURED AND FLUSHING WELL.  ALL DUE MEDS 
GIVEN AS ORDERED & SCHEDULED ; PATIENT TOLERATED WELL. PATIENT KEPT CLEAN & COMFORTABLE 
WITHIN THE SHIFT. PATIENT ENDORSED TO INCOMING SHIFT RN WITH STABLE VITAL SIGN AND FOR 
CONTINUITY OF CARE.

## 2021-09-24 NOTE — NUR
OPENING NOTE:  REPORT RECEIVED FROM THERESA LEMUS.  PT ALERT OX3, FOLLOWS COMMANDS.  5L NC.  
ANDESR CATHETER DRAINING WITHOUT DIFFICULTY.  COLOSTOMY BAG DRAINING WITHOUT DIFFICULTY.  PT 
CHECKED ON HOURLY AND PRN BY NURSING STAFF.

## 2021-09-24 NOTE — NUR
RN NOTES



PATIENT REMAINED TO BE IN NO SIGNS OF ACUTE RESPIRATORY DISTRESS , VITAL SIGNS WNL AT THIS 
TIME. PT CARE DONE. WILL CONTINUE TO MONITOR AND REASSESS FOR ANY CHANGES THROUGHOUT THE 
SHIFT.

## 2021-09-24 NOTE — NUR
PT TRANSFERRED VIA BED TO ROOM 309-2, 2 RNS ACCOMPANIED PATIENT.  ALFONSO RN MET PATIENT IN 
ROOM. PT HANDOFF DONE IN ROOM.  FAMILY WAS NOTIFIED OF ROOM CHANGE.

## 2021-09-24 NOTE — NUR
TELE RN NOTES 



PT AWAKE IN BED, ALERT/ORIENTED X 2-3, PT ABLE TO MAKE NEEDS KNOWN. PT STABLE ON 5 LPM OF 
OXYGEN VIA NASAL CANNULA, NO S/S OF DISTRESS OR SOB NOTED, BREATHING EVEN AND UNLABORED. PT 
ON EXTERNAL CARDIAC MONITOR READING SR, HR: 87. RIGHT UPPER ARM MIDLINE INTACT AND RUNNING 
NS @ 50 ML/HR. RIGHT LQ COLOSTOMY BAG INTACT, ANDERS CATHETER INTACT AND DRAINING WELL. 
SAFETY MEASURES IN PLACE: CALL LIGHT WITHIN REACH, SIDE RAILS UP X 3, BED LOCKED IN LOW 
POSITION, HOB ELEVATED, BED ALARM ON. WILL CONTINUE TO MONITOR PATIENT

## 2021-09-25 VITALS — SYSTOLIC BLOOD PRESSURE: 121 MMHG | DIASTOLIC BLOOD PRESSURE: 68 MMHG

## 2021-09-25 VITALS — SYSTOLIC BLOOD PRESSURE: 156 MMHG | DIASTOLIC BLOOD PRESSURE: 91 MMHG

## 2021-09-25 VITALS — DIASTOLIC BLOOD PRESSURE: 78 MMHG | SYSTOLIC BLOOD PRESSURE: 129 MMHG

## 2021-09-25 VITALS — SYSTOLIC BLOOD PRESSURE: 114 MMHG | DIASTOLIC BLOOD PRESSURE: 60 MMHG

## 2021-09-25 VITALS — SYSTOLIC BLOOD PRESSURE: 115 MMHG | DIASTOLIC BLOOD PRESSURE: 61 MMHG

## 2021-09-25 VITALS — SYSTOLIC BLOOD PRESSURE: 159 MMHG | DIASTOLIC BLOOD PRESSURE: 88 MMHG

## 2021-09-25 LAB
BASOPHILS # BLD AUTO: 0 K/UL (ref 0–0.2)
BASOPHILS NFR BLD AUTO: 0.6 % (ref 0–2)
BUN SERPL-MCNC: 15 MG/DL (ref 7–18)
CALCIUM SERPL-MCNC: 8 MG/DL (ref 8.5–10.1)
CHLORIDE SERPL-SCNC: 107 MMOL/L (ref 98–107)
CO2 SERPL-SCNC: 31 MMOL/L (ref 21–32)
CREAT SERPL-MCNC: 0.7 MG/DL (ref 0.6–1.3)
EOSINOPHIL NFR BLD AUTO: 0.8 % (ref 0–6)
GLUCOSE SERPL-MCNC: 114 MG/DL (ref 74–106)
HCT VFR BLD AUTO: 37 % (ref 39–51)
HGB BLD-MCNC: 12.4 G/DL (ref 13.5–17.5)
LYMPHOCYTES NFR BLD AUTO: 0.5 K/UL (ref 0.8–4.8)
LYMPHOCYTES NFR BLD AUTO: 10.9 % (ref 20–44)
MAGNESIUM SERPL-MCNC: 2 MG/DL (ref 1.8–2.4)
MCHC RBC AUTO-ENTMCNC: 33 G/DL (ref 31–36)
MCV RBC AUTO: 102 FL (ref 80–96)
MONOCYTES NFR BLD AUTO: 1 K/UL (ref 0.1–1.3)
MONOCYTES NFR BLD AUTO: 22.1 % (ref 2–12)
NEUTROPHILS # BLD AUTO: 2.8 K/UL (ref 1.8–8.9)
NEUTROPHILS NFR BLD AUTO: 65.6 % (ref 43–81)
PHOSPHATE SERPL-MCNC: 2.6 MG/DL (ref 2.5–4.9)
PLATELET # BLD AUTO: 164 K/UL (ref 150–450)
POTASSIUM SERPL-SCNC: 4.2 MMOL/L (ref 3.5–5.1)
RBC # BLD AUTO: 3.66 MIL/UL (ref 4.5–6)
SODIUM SERPL-SCNC: 140 MMOL/L (ref 136–145)
WBC NRBC COR # BLD AUTO: 4.3 K/UL (ref 4.3–11)

## 2021-09-25 RX ADMIN — ACETAMINOPHEN PRN MG: 325 TABLET ORAL at 17:02

## 2021-09-25 RX ADMIN — ALBUTEROL SULFATE SCH MG: 1.25 SOLUTION RESPIRATORY (INHALATION) at 19:44

## 2021-09-25 RX ADMIN — PIPERACILLIN SODIUM AND TAZOBACTAM SODIUM SCH MLS/HR: .375; 3 INJECTION, POWDER, LYOPHILIZED, FOR SOLUTION INTRAVENOUS at 11:19

## 2021-09-25 RX ADMIN — Medication SCH MG: at 07:56

## 2021-09-25 RX ADMIN — TOBRAMYCIN AND DEXAMETHASONE SCH DROP: 3; 1 SUSPENSION/ DROPS OPHTHALMIC at 20:19

## 2021-09-25 RX ADMIN — ALBUTEROL SULFATE SCH MG: 1.25 SOLUTION RESPIRATORY (INHALATION) at 10:55

## 2021-09-25 RX ADMIN — PIPERACILLIN SODIUM AND TAZOBACTAM SODIUM SCH MLS/HR: .375; 3 INJECTION, POWDER, LYOPHILIZED, FOR SOLUTION INTRAVENOUS at 04:10

## 2021-09-25 RX ADMIN — ATORVASTATIN CALCIUM SCH MG: 40 TABLET, FILM COATED ORAL at 21:08

## 2021-09-25 RX ADMIN — GABAPENTIN SCH MG: 100 CAPSULE ORAL at 08:53

## 2021-09-25 RX ADMIN — Medication SCH MG: at 19:45

## 2021-09-25 RX ADMIN — ALBUTEROL SULFATE SCH MG: 1.25 SOLUTION RESPIRATORY (INHALATION) at 03:08

## 2021-09-25 RX ADMIN — Medication SCH MG: at 16:15

## 2021-09-25 RX ADMIN — ALBUTEROL SULFATE SCH MG: 1.25 SOLUTION RESPIRATORY (INHALATION) at 16:15

## 2021-09-25 RX ADMIN — DEXTROSE MONOHYDRATE SCH MLS/HR: 50 INJECTION, SOLUTION INTRAVENOUS at 09:01

## 2021-09-25 RX ADMIN — Medication SCH MG: at 16:43

## 2021-09-25 RX ADMIN — METOPROLOL TARTRATE SCH MG: 50 TABLET, FILM COATED ORAL at 08:53

## 2021-09-25 RX ADMIN — TOBRAMYCIN AND DEXAMETHASONE SCH DROP: 3; 1 SUSPENSION/ DROPS OPHTHALMIC at 11:19

## 2021-09-25 RX ADMIN — METOPROLOL TARTRATE SCH MG: 50 TABLET, FILM COATED ORAL at 16:44

## 2021-09-25 RX ADMIN — GABAPENTIN SCH MG: 100 CAPSULE ORAL at 16:43

## 2021-09-25 RX ADMIN — TOBRAMYCIN AND DEXAMETHASONE SCH DROP: 3; 1 SUSPENSION/ DROPS OPHTHALMIC at 09:10

## 2021-09-25 RX ADMIN — FAMOTIDINE SCH MG: 20 TABLET, FILM COATED ORAL at 16:43

## 2021-09-25 RX ADMIN — ENOXAPARIN SODIUM SCH MG: 40 INJECTION SUBCUTANEOUS at 12:12

## 2021-09-25 RX ADMIN — PIPERACILLIN SODIUM AND TAZOBACTAM SODIUM SCH MLS/HR: .375; 3 INJECTION, POWDER, LYOPHILIZED, FOR SOLUTION INTRAVENOUS at 20:17

## 2021-09-25 RX ADMIN — Medication SCH MG: at 10:55

## 2021-09-25 RX ADMIN — Medication SCH MG: at 03:08

## 2021-09-25 RX ADMIN — ALBUTEROL SULFATE SCH MG: 1.25 SOLUTION RESPIRATORY (INHALATION) at 07:56

## 2021-09-25 RX ADMIN — FLUVOXAMINE MALEATE SCH MG: 50 TABLET, FILM COATED ORAL at 21:08

## 2021-09-25 RX ADMIN — FLUVOXAMINE MALEATE SCH MG: 50 TABLET, FILM COATED ORAL at 00:16

## 2021-09-25 RX ADMIN — FERROUS SULFATE TAB 325 MG (65 MG ELEMENTAL FE) SCH MG: 325 (65 FE) TAB at 08:53

## 2021-09-25 RX ADMIN — TOBRAMYCIN AND DEXAMETHASONE SCH DROP: 3; 1 SUSPENSION/ DROPS OPHTHALMIC at 16:45

## 2021-09-25 RX ADMIN — TAMSULOSIN HYDROCHLORIDE SCH MG: 0.4 CAPSULE ORAL at 17:02

## 2021-09-25 RX ADMIN — FAMOTIDINE SCH MG: 20 TABLET, FILM COATED ORAL at 08:53

## 2021-09-25 RX ADMIN — Medication SCH MG: at 21:08

## 2021-09-25 RX ADMIN — Medication SCH MG: at 08:53

## 2021-09-25 RX ADMIN — GABAPENTIN SCH MG: 100 CAPSULE ORAL at 12:12

## 2021-09-25 NOTE — NUR
TELE RN OPENING NOTES 



RECEIVED PATIENT IN BED, AWAKE, ALERT/ORIENTED X 2, CONFUSED. PT ON 5 LPM OF OXYGEN VIA 
NASAL CANNULA, NO S/S OF DISTRESS OR SOB NOTED, BREATHING EVEN AND UNLABORED. PT ON EXTERNAL 
CARDIAC MONITOR READING SR 95. RIGHT HAND RUNNING NS @ 50 ML/HR. RIGHT LQ COLOSTOMY BAG 
INTACT, ANDERS CATHETER INTACT AND DRAINING WELL. SAFETY PRECAUTIONS IN PLACE: BED IN LOW 
POSITION AND LOCKED, RAILS UP X2, CALL LIGHT WITHIN REACH, HOB ELEVATED, BED ALARM ON. WILL 
CONTINUE TO MONITOR PATIENT.

## 2021-09-25 NOTE — NUR
TELE RN CLOSING NOTES 



PT SLEEPING IN BED, ALERT/ORIENTED X 2, CONFUSED AT TIMES DURING SHIFT. PT STABLE ON 5 LPM 
OF OXYGEN VIA NASAL CANNULA, NO S/S OF DISTRESS OR SOB NOTED, BREATHING EVEN AND UNLABORED. 
PT ON EXTERNAL CARDIAC MONITOR READING SR WITH PAC'S, HR: 94. RIGHT HAND RUNNING NS @ 50 
ML/HR. RIGHT LQ COLOSTOMY BAG INTACT, ANDERS CATHETER INTACT AND DRAINING WELL. MEDICATIONS 
GIVEN AS ORDERED, PT NEEDS MET THROUGHOUT SHIFT. SAFETY MEASURES IN PLACE: CALL LIGHT WITHIN 
REACH, SIDE RAILS UP X 3, BED LOCKED IN LOW POSITION, HOB ELEVATED, BED ALARM ON. ENDORSED 
TO DAY SHIFT NURSE FOR CONTINUITY OF CARE

## 2021-09-25 NOTE — NUR
TELE RN NOTE 



LUVOX 50 MG GIVEN LATE BECAUSE IT WAS NOT PRESENT IN CASSETTE OR OMNICELL. CONTACTED NURSING 
SUPERVISOR WHO BROUGHT MEDICATION. MEDICATION GIVEN AS ORDERED

## 2021-09-25 NOTE — NUR
TELE RN CLOSING NOTES 



PATIENT REMAINS IN BED, AWAKE, ALERT/ORIENTED X 2, CONFUSED. PT ON 3 LPM OF OXYGEN VIA NASAL 
CANNULA, NO S/S OF DISTRESS OR SOB NOTED, BREATHING EVEN AND UNLABORED. PT ON EXTERNAL 
CARDIAC MONITOR READING SR 78. RIGHT HAND RUNNING NS @ 50 ML/HR. RIGHT LQ COLOSTOMY BAG 
INTACT, CHANGED, ANDERS CATHETER INTACT AND DRAINING WELL. ALL NEEDS ATTENDED DURING THE DAY. 
SAFETY PRECAUTIONS IN PLACE: BED IN LOW POSITION AND LOCKED, RAILS UP X2, CALL LIGHT WITHIN 
REACH, HOB ELEVATED, BED ALARM ON. WILL ENDORSE TO NIGHT SHIFT NURSE FOR ALVIN.

## 2021-09-25 NOTE — NUR
MS RN OPENING NOTE

RECEIVED PT IN BED, RESTING A/OX2, PT STABLE ON 3 L OXYGEN VIA NC. NO S/S OF RESPIRATORY 
DISTRESS. PT IS ON EXTERNAL CARDIAC MONITOR READING SR 77. NO C/O PAIN AT THIS TIME. IV 
ACCESS IN RIGHT WRIST # 22, INFUSING NS AT 50 ML/HR, IV IS INTACT, PATENT, AND FLUSHING 
WELL. ANDERS CATHETER DRAINING CLEAR, YELLOW URINE. SAFETY MEASURES MAINTAINED AT ALL TIMES. 
BED IN LOWEST LOCKED POSITION, HOB ELEVATED, SIDE RAILS UP X2. CALL LIGHT AND TABLE WITHIN 
REACH. WILL CONTINUE WITH PLAN OF CARE.

## 2021-09-26 VITALS — SYSTOLIC BLOOD PRESSURE: 162 MMHG | DIASTOLIC BLOOD PRESSURE: 93 MMHG

## 2021-09-26 VITALS — DIASTOLIC BLOOD PRESSURE: 80 MMHG | SYSTOLIC BLOOD PRESSURE: 160 MMHG

## 2021-09-26 VITALS — SYSTOLIC BLOOD PRESSURE: 154 MMHG | DIASTOLIC BLOOD PRESSURE: 77 MMHG

## 2021-09-26 VITALS — SYSTOLIC BLOOD PRESSURE: 107 MMHG | DIASTOLIC BLOOD PRESSURE: 59 MMHG

## 2021-09-26 LAB
BASOPHILS # BLD AUTO: 0 K/UL (ref 0–0.2)
BASOPHILS NFR BLD AUTO: 0.6 % (ref 0–2)
BUN SERPL-MCNC: 11 MG/DL (ref 7–18)
CALCIUM SERPL-MCNC: 8.6 MG/DL (ref 8.5–10.1)
CHLORIDE SERPL-SCNC: 107 MMOL/L (ref 98–107)
CO2 SERPL-SCNC: 28 MMOL/L (ref 21–32)
CREAT SERPL-MCNC: 0.7 MG/DL (ref 0.6–1.3)
EOSINOPHIL NFR BLD AUTO: 0.8 % (ref 0–6)
GLUCOSE SERPL-MCNC: 109 MG/DL (ref 74–106)
HCT VFR BLD AUTO: 39 % (ref 39–51)
HGB BLD-MCNC: 13 G/DL (ref 13.5–17.5)
LYMPHOCYTES NFR BLD AUTO: 0.5 K/UL (ref 0.8–4.8)
LYMPHOCYTES NFR BLD AUTO: 8.3 % (ref 20–44)
MAGNESIUM SERPL-MCNC: 1.9 MG/DL (ref 1.8–2.4)
MCHC RBC AUTO-ENTMCNC: 33 G/DL (ref 31–36)
MCV RBC AUTO: 101 FL (ref 80–96)
MONOCYTES NFR BLD AUTO: 1 K/UL (ref 0.1–1.3)
MONOCYTES NFR BLD AUTO: 16.9 % (ref 2–12)
NEUTROPHILS # BLD AUTO: 4.4 K/UL (ref 1.8–8.9)
NEUTROPHILS NFR BLD AUTO: 73.4 % (ref 43–81)
PHOSPHATE SERPL-MCNC: 2.5 MG/DL (ref 2.5–4.9)
PLATELET # BLD AUTO: 183 K/UL (ref 150–450)
POTASSIUM SERPL-SCNC: 3.8 MMOL/L (ref 3.5–5.1)
RBC # BLD AUTO: 3.91 MIL/UL (ref 4.5–6)
SODIUM SERPL-SCNC: 142 MMOL/L (ref 136–145)
WBC NRBC COR # BLD AUTO: 5.9 K/UL (ref 4.3–11)

## 2021-09-26 RX ADMIN — FLUVOXAMINE MALEATE SCH MG: 50 TABLET, FILM COATED ORAL at 22:28

## 2021-09-26 RX ADMIN — METOPROLOL TARTRATE SCH MG: 50 TABLET, FILM COATED ORAL at 16:22

## 2021-09-26 RX ADMIN — FAMOTIDINE SCH MG: 20 TABLET, FILM COATED ORAL at 09:14

## 2021-09-26 RX ADMIN — ALBUTEROL SULFATE SCH MG: 1.25 SOLUTION RESPIRATORY (INHALATION) at 03:47

## 2021-09-26 RX ADMIN — PIPERACILLIN SODIUM AND TAZOBACTAM SODIUM SCH MLS/HR: .375; 3 INJECTION, POWDER, LYOPHILIZED, FOR SOLUTION INTRAVENOUS at 20:11

## 2021-09-26 RX ADMIN — PIPERACILLIN SODIUM AND TAZOBACTAM SODIUM SCH MLS/HR: .375; 3 INJECTION, POWDER, LYOPHILIZED, FOR SOLUTION INTRAVENOUS at 11:43

## 2021-09-26 RX ADMIN — Medication SCH MG: at 22:55

## 2021-09-26 RX ADMIN — FERROUS SULFATE TAB 325 MG (65 MG ELEMENTAL FE) SCH MG: 325 (65 FE) TAB at 09:14

## 2021-09-26 RX ADMIN — Medication SCH MG: at 11:15

## 2021-09-26 RX ADMIN — GABAPENTIN SCH MG: 100 CAPSULE ORAL at 16:21

## 2021-09-26 RX ADMIN — TOBRAMYCIN AND DEXAMETHASONE SCH DROP: 3; 1 SUSPENSION/ DROPS OPHTHALMIC at 11:33

## 2021-09-26 RX ADMIN — TAMSULOSIN HYDROCHLORIDE SCH MG: 0.4 CAPSULE ORAL at 17:08

## 2021-09-26 RX ADMIN — Medication SCH MG: at 22:29

## 2021-09-26 RX ADMIN — ALBUTEROL SULFATE SCH MG: 1.25 SOLUTION RESPIRATORY (INHALATION) at 19:26

## 2021-09-26 RX ADMIN — GABAPENTIN SCH MG: 100 CAPSULE ORAL at 09:14

## 2021-09-26 RX ADMIN — ALBUTEROL SULFATE SCH MG: 1.25 SOLUTION RESPIRATORY (INHALATION) at 22:55

## 2021-09-26 RX ADMIN — Medication SCH MG: at 15:57

## 2021-09-26 RX ADMIN — Medication SCH MG: at 03:47

## 2021-09-26 RX ADMIN — ALBUTEROL SULFATE SCH MG: 1.25 SOLUTION RESPIRATORY (INHALATION) at 15:57

## 2021-09-26 RX ADMIN — ATORVASTATIN CALCIUM SCH MG: 40 TABLET, FILM COATED ORAL at 22:28

## 2021-09-26 RX ADMIN — ALBUTEROL SULFATE SCH MG: 1.25 SOLUTION RESPIRATORY (INHALATION) at 00:24

## 2021-09-26 RX ADMIN — Medication SCH MG: at 00:24

## 2021-09-26 RX ADMIN — SODIUM CHLORIDE PRN MLS/HR: 9 INJECTION, SOLUTION INTRAVENOUS at 04:54

## 2021-09-26 RX ADMIN — ALBUTEROL SULFATE SCH MG: 1.25 SOLUTION RESPIRATORY (INHALATION) at 08:03

## 2021-09-26 RX ADMIN — Medication SCH MG: at 19:26

## 2021-09-26 RX ADMIN — TOBRAMYCIN AND DEXAMETHASONE SCH DROP: 3; 1 SUSPENSION/ DROPS OPHTHALMIC at 16:03

## 2021-09-26 RX ADMIN — DEXTROSE MONOHYDRATE SCH MLS/HR: 50 INJECTION, SOLUTION INTRAVENOUS at 09:14

## 2021-09-26 RX ADMIN — FAMOTIDINE SCH MG: 20 TABLET, FILM COATED ORAL at 16:21

## 2021-09-26 RX ADMIN — TOBRAMYCIN AND DEXAMETHASONE SCH DROP: 3; 1 SUSPENSION/ DROPS OPHTHALMIC at 20:11

## 2021-09-26 RX ADMIN — Medication SCH MG: at 08:03

## 2021-09-26 RX ADMIN — Medication SCH MG: at 09:14

## 2021-09-26 RX ADMIN — METOPROLOL TARTRATE SCH MG: 50 TABLET, FILM COATED ORAL at 09:15

## 2021-09-26 RX ADMIN — PIPERACILLIN SODIUM AND TAZOBACTAM SODIUM SCH MLS/HR: .375; 3 INJECTION, POWDER, LYOPHILIZED, FOR SOLUTION INTRAVENOUS at 04:46

## 2021-09-26 RX ADMIN — TOBRAMYCIN AND DEXAMETHASONE SCH DROP: 3; 1 SUSPENSION/ DROPS OPHTHALMIC at 07:46

## 2021-09-26 RX ADMIN — ALBUTEROL SULFATE SCH MG: 1.25 SOLUTION RESPIRATORY (INHALATION) at 11:15

## 2021-09-26 RX ADMIN — ENOXAPARIN SODIUM SCH MG: 40 INJECTION SUBCUTANEOUS at 12:00

## 2021-09-26 RX ADMIN — Medication SCH MG: at 16:22

## 2021-09-26 RX ADMIN — GABAPENTIN SCH MG: 100 CAPSULE ORAL at 12:23

## 2021-09-26 NOTE — NUR
TELE  RN OPENING NOTES



RECEIVED PT IN BED, AWAKE, A/O X 1, PT STABLE ON 4 L OXYGEN VIA NC. NO S/S OF RESPIRATORY 
DISTRESS. PT IS ON EXTERNAL CARDIAC MONITOR READING ST HR 'S. NO C/O PAIN AT THIS 
TIME. IV ACCESS IN RIGHT WRIST G# 22, ONGOING NS AT 50 ML/HR, IV IS INTACT, PATENT, AND 
FLUSHING WELL. ANDERS CATHETER DRAINING CLEAR, YELLOW URINE. WITH COLOSTOMY IN PLACE. SAFETY 
MEASURES IN PLACE. BED IN LOWEST LOCKED POSITION, HOB ELEVATED, SIDE RAILS UP X2. CALL LIGHT 
AND TABLE WITHIN REACH. WILL CONTINUE TO MONITOR ACCORDINGLY.

## 2021-09-26 NOTE — NUR
RN NOTES



FED PATIENT, HOB KEPT ELEVATED. PATIENT ABLE TO TOLERATE DIET ORDER. WILL CONTINUE TO 
MONITOR ACCORDINGLY.

## 2021-09-26 NOTE — NUR
TELE  RN CLOSING NOTES



PATIENT IN BED, ASLEEP, EASILY AWAKEN BY VERBAL AND TACTILE STIMULI. PT STABLE ON 4 L OXYGEN 
VIA NC. NO S/S OF RESPIRATORY DISTRESS. PT IS ON EXTERNAL CARDIAC MONITOR READING SR HR AT 
80'S. NO C/O PAIN AT THIS TIME. IV ACCESS IN RIGHT WRIST G# 22 PATENT AND FLUSHES WELL. 
ANDERS CATHETER DRAINING CLEAR, YELLOW URINE. WITH COLOSTOMY BAG IN PLACE. SAFETY MEASURES IN 
PLACE. BED IN LOWEST LOCKED POSITION, HOB ELEVATED, SIDE RAILS UP X2. CALL LIGHT AND TABLE 
WITHIN REACH. ALL NEEDS ATTENDED AND MET, ALL DUE MEDS GIVEN AS ORDERED. WILL ENDORSE TO 
ONCOMING SHIFT FOR ALVIN.

## 2021-09-26 NOTE — NUR
PT /93, DR KOCH MADE AWARE, RECEIVED ORDERS FROM DR ZEE [place] FOR PT TO HAVE 
CLONIDINE 0.1MG PO PRN ONCE . ORDERS RECEIVED AND CARRIED OUT. PT REFUSED. WILL CONTINUE TO 
MONITOR VITAL SIGNS.

## 2021-09-26 NOTE — NUR
TELE  RN OPENING NOTES

RECEIVED PT IN BED, AWAKE, A/O X 1-2, PT STABLE ON 4 L OXYGEN VIA NC. NO S/S OF RESPIRATORY 
DISTRESS. PT IS ON EXTERNAL CARDIAC MONITOR READING SR @ 88 HR. NO C/O PAIN AT THIS TIME. IV 
ACCESS IN RIGHT HAND G# 22 SL, IV IS INTACT, PATENT, AND FLUSHING WELL. ANDERS CATHETER 
DRAINING CLEAR, YELLOW URINE. WITH COLOSTOMY IN PLACE. SAFETY MEASURES IN PLACE. BED IN 
LOWEST LOCKED POSITION, HOB ELEVATED, SIDE RAILS UP X2. CALL LIGHT AND TABLE WITHIN REACH. 
WILL CONTINUE TO MONITOR ACCORDINGLY.

## 2021-09-27 VITALS — DIASTOLIC BLOOD PRESSURE: 78 MMHG | SYSTOLIC BLOOD PRESSURE: 154 MMHG

## 2021-09-27 VITALS — SYSTOLIC BLOOD PRESSURE: 120 MMHG | DIASTOLIC BLOOD PRESSURE: 47 MMHG

## 2021-09-27 VITALS — DIASTOLIC BLOOD PRESSURE: 70 MMHG | SYSTOLIC BLOOD PRESSURE: 118 MMHG

## 2021-09-27 VITALS — SYSTOLIC BLOOD PRESSURE: 121 MMHG | DIASTOLIC BLOOD PRESSURE: 69 MMHG

## 2021-09-27 VITALS — SYSTOLIC BLOOD PRESSURE: 125 MMHG | DIASTOLIC BLOOD PRESSURE: 88 MMHG

## 2021-09-27 VITALS — SYSTOLIC BLOOD PRESSURE: 45 MMHG

## 2021-09-27 LAB
BASE EXCESS BLDA CALC-SCNC: 4 MMOL/L
BASOPHILS # BLD AUTO: 0 K/UL (ref 0–0.2)
BASOPHILS NFR BLD AUTO: 0.6 % (ref 0–2)
BUN SERPL-MCNC: 12 MG/DL (ref 7–18)
CALCIUM SERPL-MCNC: 8.3 MG/DL (ref 8.5–10.1)
CHLORIDE SERPL-SCNC: 105 MMOL/L (ref 98–107)
CO2 SERPL-SCNC: 36 MMOL/L (ref 21–32)
CREAT SERPL-MCNC: 0.8 MG/DL (ref 0.6–1.3)
DO-HGB MFR BLDA: 160.3 MMHG
EOSINOPHIL NFR BLD AUTO: 0.1 % (ref 0–6)
GLUCOSE SERPL-MCNC: 127 MG/DL (ref 74–106)
HCT VFR BLD AUTO: 38 % (ref 39–51)
HGB BLD-MCNC: 12.5 G/DL (ref 13.5–17.5)
INHALED O2 CONCENTRATION: 40 %
INHALED O2 FLOW RATE: 5 L/MIN (ref 0–30)
LYMPHOCYTES NFR BLD AUTO: 0.6 K/UL (ref 0.8–4.8)
LYMPHOCYTES NFR BLD AUTO: 10.5 % (ref 20–44)
LYMPHOCYTES NFR BLD MANUAL: 10 % (ref 16–48)
MAGNESIUM SERPL-MCNC: 1.7 MG/DL (ref 1.8–2.4)
MCHC RBC AUTO-ENTMCNC: 33 G/DL (ref 31–36)
MCV RBC AUTO: 100 FL (ref 80–96)
MONOCYTES NFR BLD AUTO: 1 K/UL (ref 0.1–1.3)
MONOCYTES NFR BLD AUTO: 18 % (ref 2–12)
MONOCYTES NFR BLD MANUAL: 5 % (ref 0–11)
NEUTROPHILS # BLD AUTO: 4.1 K/UL (ref 1.8–8.9)
NEUTROPHILS NFR BLD AUTO: 70.8 % (ref 43–81)
NEUTS SEG NFR BLD MANUAL: 85 % (ref 42–76)
PCO2 TEMP ADJ BLDA: 60.2 MMHG (ref 35–45)
PH TEMP ADJ BLDA: 7.34 [PH] (ref 7.35–7.45)
PHOSPHATE SERPL-MCNC: 3.2 MG/DL (ref 2.5–4.9)
PLATELET # BLD AUTO: 215 K/UL (ref 150–450)
PO2 TEMP ADJ BLDA: 55.7 MMHG (ref 75–100)
POTASSIUM SERPL-SCNC: 4 MMOL/L (ref 3.5–5.1)
RBC # BLD AUTO: 3.74 MIL/UL (ref 4.5–6)
SAO2 % BLDA: 88.3 % (ref 92–98.5)
SODIUM SERPL-SCNC: 142 MMOL/L (ref 136–145)
VENTILATION MODE VENT: (no result)
WBC NRBC COR # BLD AUTO: 5.8 K/UL (ref 4.3–11)

## 2021-09-27 RX ADMIN — GABAPENTIN SCH MG: 100 CAPSULE ORAL at 12:54

## 2021-09-27 RX ADMIN — FAMOTIDINE SCH MG: 20 TABLET, FILM COATED ORAL at 18:00

## 2021-09-27 RX ADMIN — DEXTROSE MONOHYDRATE SCH MLS/HR: 50 INJECTION, SOLUTION INTRAVENOUS at 08:58

## 2021-09-27 RX ADMIN — GABAPENTIN SCH MG: 100 CAPSULE ORAL at 18:00

## 2021-09-27 RX ADMIN — Medication SCH MG: at 15:11

## 2021-09-27 RX ADMIN — ALBUTEROL SULFATE SCH MG: 1.25 SOLUTION RESPIRATORY (INHALATION) at 08:31

## 2021-09-27 RX ADMIN — Medication SCH MG: at 18:00

## 2021-09-27 RX ADMIN — GABAPENTIN SCH MG: 100 CAPSULE ORAL at 08:50

## 2021-09-27 RX ADMIN — FAMOTIDINE SCH MG: 20 TABLET, FILM COATED ORAL at 08:50

## 2021-09-27 RX ADMIN — METOPROLOL TARTRATE SCH MG: 50 TABLET, FILM COATED ORAL at 18:01

## 2021-09-27 RX ADMIN — FERROUS SULFATE TAB 325 MG (65 MG ELEMENTAL FE) SCH MG: 325 (65 FE) TAB at 08:51

## 2021-09-27 RX ADMIN — Medication SCH MG: at 19:51

## 2021-09-27 RX ADMIN — TOBRAMYCIN AND DEXAMETHASONE SCH DROP: 3; 1 SUSPENSION/ DROPS OPHTHALMIC at 16:27

## 2021-09-27 RX ADMIN — ALBUTEROL SULFATE SCH MG: 1.25 SOLUTION RESPIRATORY (INHALATION) at 03:23

## 2021-09-27 RX ADMIN — TOBRAMYCIN AND DEXAMETHASONE SCH DROP: 3; 1 SUSPENSION/ DROPS OPHTHALMIC at 20:39

## 2021-09-27 RX ADMIN — PIPERACILLIN SODIUM AND TAZOBACTAM SODIUM SCH MLS/HR: .375; 3 INJECTION, POWDER, LYOPHILIZED, FOR SOLUTION INTRAVENOUS at 11:14

## 2021-09-27 RX ADMIN — ALBUTEROL SULFATE SCH MG: 1.25 SOLUTION RESPIRATORY (INHALATION) at 19:51

## 2021-09-27 RX ADMIN — TOBRAMYCIN AND DEXAMETHASONE SCH DROP: 3; 1 SUSPENSION/ DROPS OPHTHALMIC at 11:14

## 2021-09-27 RX ADMIN — PIPERACILLIN SODIUM AND TAZOBACTAM SODIUM SCH MLS/HR: .375; 3 INJECTION, POWDER, LYOPHILIZED, FOR SOLUTION INTRAVENOUS at 03:35

## 2021-09-27 RX ADMIN — ALBUTEROL SULFATE SCH MG: 1.25 SOLUTION RESPIRATORY (INHALATION) at 11:30

## 2021-09-27 RX ADMIN — Medication SCH MG: at 22:53

## 2021-09-27 RX ADMIN — TOBRAMYCIN AND DEXAMETHASONE SCH DROP: 3; 1 SUSPENSION/ DROPS OPHTHALMIC at 07:41

## 2021-09-27 RX ADMIN — Medication SCH MG: at 08:31

## 2021-09-27 RX ADMIN — TAMSULOSIN HYDROCHLORIDE SCH MG: 0.4 CAPSULE ORAL at 18:00

## 2021-09-27 RX ADMIN — Medication SCH MG: at 23:37

## 2021-09-27 RX ADMIN — FLUVOXAMINE MALEATE SCH MG: 50 TABLET, FILM COATED ORAL at 22:53

## 2021-09-27 RX ADMIN — ATORVASTATIN CALCIUM SCH MG: 40 TABLET, FILM COATED ORAL at 22:54

## 2021-09-27 RX ADMIN — METOPROLOL TARTRATE SCH MG: 50 TABLET, FILM COATED ORAL at 08:51

## 2021-09-27 RX ADMIN — ALBUTEROL SULFATE SCH MG: 1.25 SOLUTION RESPIRATORY (INHALATION) at 23:37

## 2021-09-27 RX ADMIN — ALBUTEROL SULFATE SCH MG: 1.25 SOLUTION RESPIRATORY (INHALATION) at 15:11

## 2021-09-27 RX ADMIN — Medication SCH MG: at 03:23

## 2021-09-27 RX ADMIN — Medication SCH MG: at 08:51

## 2021-09-27 RX ADMIN — PIPERACILLIN SODIUM AND TAZOBACTAM SODIUM SCH MLS/HR: .375; 3 INJECTION, POWDER, LYOPHILIZED, FOR SOLUTION INTRAVENOUS at 20:39

## 2021-09-27 RX ADMIN — ENOXAPARIN SODIUM SCH MG: 40 INJECTION SUBCUTANEOUS at 11:51

## 2021-09-27 RX ADMIN — Medication SCH MG: at 11:30

## 2021-09-27 NOTE — NUR
TELE  RN OPENING NOTES



RECEIVED PATIENT IN BED, ASLEEP, EASILY AWAKEN BY VERBAL AND TACTILE STIMULI, PT STABLE ON 4 
L OXYGEN VIA NC. NO S/S OF ACUTE DISTRESS NOTED. PT IS ON EXTERNAL CARDIAC MONITOR READING 
SR HR AT 90'S. NO C/O PAIN AT THIS TIME. IV ACCESS IN RIGHT HAND G# 22 SL, IV IS INTACT, 
PATENT, AND FLUSHING WELL. ANDERS CATHETER DRAINING CLEAR, YELLOW URINE. WITH COLOSTOMY IN 
PLACE. SAFETY MEASURES IN PLACE. BED IN LOWEST LOCKED POSITION, HOB ELEVATED, SIDE RAILS UP 
X2. CALL LIGHT AND TABLE WITHIN REACH. WILL CONTINUE TO MONITOR ACCORDINGLY.

## 2021-09-27 NOTE — NUR
RN NOTES



PLACED PATIENT ON SIMPLE MASK AT 10LPM. DEEP SUCTION DONE C/O RT. PATIENT SATURATING WELL AT 
98%.

## 2021-09-27 NOTE — NUR
TELE RN CLOSING NOTE

PT IS AWAKE IN BED AT THIS TIME. A/OX 1-2, ABLE TO MAKE NEEDS KNOWN. PT IS BR, STABLE ON 4L 
OXYGEN VIA NC, NO SOB NOTED, NO S/S OF RESPIRATORY DISTRESS. PT IS ON EXTERNAL CARDIAC 
MONITOR READING SR 87 WITH PVC. PT DENIES PAIN OR DISCOMFORT AT THIS TIME. SKIN IS INTACT, 
IV ACCESS IS INTACT AND PATENT. ALL NEEDS HAVE BEEN MET. MEDICATIONS ADMINISTERED PER ORDER, 
SAFETY, SEIZURE, AND ASPIRATION PRECAUTIONS MAINTAINED AT ALL TIMES. BED IN LOWEST, LOCKED 
POSITION WITH BED ALARM ON, SIDE RAILS UP X2. HOB ELEVATED. CALL LIGHT AND TABLE WITHIN 
REACH. WILL ENDORSE TO ONCOMING NURSE FOR ALVIN.

## 2021-09-27 NOTE — NUR
TEL RN OPENING NOTES:



RECEIVED PATIETN AWAKE IN BED, BED IN LOW POSITION, CALL LIGHTS WITHIN REACH, NO COMPLAIN OF 
PAIN AND DISCOMFORT AT THIS TIME. PATIENT IS  ON 02 AT 5LPM VIA NASAL CANNULA MOUTH 
BREATHING, LETHARGIC,  ON MONITORING FOR DESATURATION . HOB AT 45 DEGREE, ASPIRATION 
PRECAUTION, WITH IV LINE AT RT HAND G 22 SL ON ANDERS CATHETER WITH 40CCC URINE OUTPUT,  WTH 
COLOSTOMY BAG, NO LEAKING WAS NOTED, PATIENT KEPT CLEAN AND DRY, WILL CONTINUE TO MONITOR.

## 2021-09-27 NOTE — NUR
TELE  RN CLOSING NOTES



PATIENT IN BED, ASLEEP, EASILY AWAKEN BY VERBAL AND TACTILE STIMULI. PT STABLE ON 6 L OXYGEN 
VIA NC. NO S/S OF RESPIRATORY DISTRESS. PT IS ON EXTERNAL CARDIAC MONITOR READING SR HR AT 
80'S. NO C/O PAIN AT THIS TIME. IV ACCESS IN RIGHT WRIST G# 22 PATENT AND FLUSHES WELL. 
ANDERS CATHETER DRAINING CLEAR, YELLOW URINE. WITH COLOSTOMY BAG IN PLACE. SAFETY MEASURES IN 
PLACE. BED IN LOWEST LOCKED POSITION, HOB ELEVATED, SIDE RAILS UP X2. CALL LIGHT AND TABLE 
WITHIN REACH. PENDING LAB RESULTS. ALL NEEDS ATTENDED AND MET, ALL DUE MEDS GIVEN AS 
ORDERED. WILL ENDORSE TO ONCOMING SHIFT FOR ALVIN.

## 2021-09-27 NOTE — NUR
RN NOTE:



PATIENT WAS OBSERVED LETHARGIC,  ON DESATURATION, NOTIFIED DR ON CALL AND ORDERED STAT ABG,  
AND TO PUT ON SIMPLE MASK, IF PATIENT IS DESATURATING, BUMEX 4MG SLOW IV PUSH ORDERED AND 
CARRIED OUT, BREATHING TREATMENT AND SUCTIONING DONE C/O RT, WILL CONTINUE TO MONITOR.

## 2021-09-27 NOTE — NUR
RN NOTES



PATIENT NOTED WITH ORAL SECRETIONS, CHECKED O2 SATURATION RESULT, CALLED AND REFERRED 
PATIENT TO DR ANANT VILLARREAL WITH ORDERS MADE AND CARRIED OUT.

## 2021-09-28 VITALS — SYSTOLIC BLOOD PRESSURE: 115 MMHG | DIASTOLIC BLOOD PRESSURE: 63 MMHG

## 2021-09-28 VITALS — SYSTOLIC BLOOD PRESSURE: 118 MMHG | DIASTOLIC BLOOD PRESSURE: 69 MMHG

## 2021-09-28 VITALS — SYSTOLIC BLOOD PRESSURE: 133 MMHG | DIASTOLIC BLOOD PRESSURE: 70 MMHG

## 2021-09-28 VITALS — DIASTOLIC BLOOD PRESSURE: 60 MMHG | SYSTOLIC BLOOD PRESSURE: 115 MMHG

## 2021-09-28 VITALS — SYSTOLIC BLOOD PRESSURE: 156 MMHG | DIASTOLIC BLOOD PRESSURE: 83 MMHG

## 2021-09-28 LAB
BASE EXCESS BLDA CALC-SCNC: 1.6 MMOL/L
BASE EXCESS BLDA CALC-SCNC: 5.3 MMOL/L
BASOPHILS # BLD AUTO: 0 K/UL (ref 0–0.2)
BASOPHILS NFR BLD AUTO: 0.6 % (ref 0–2)
BUN SERPL-MCNC: 13 MG/DL (ref 7–18)
CALCIUM SERPL-MCNC: 8.5 MG/DL (ref 8.5–10.1)
CHLORIDE SERPL-SCNC: 103 MMOL/L (ref 98–107)
CO2 SERPL-SCNC: 36 MMOL/L (ref 21–32)
CREAT SERPL-MCNC: 0.8 MG/DL (ref 0.6–1.3)
DO-HGB MFR BLDA: 121.8 MMHG
DO-HGB MFR BLDA: 127.8 MMHG
EOSINOPHIL NFR BLD AUTO: 0.5 % (ref 0–6)
GLUCOSE SERPL-MCNC: 119 MG/DL (ref 74–106)
HCT VFR BLD AUTO: 37 % (ref 39–51)
HGB BLD-MCNC: 12.3 G/DL (ref 13.5–17.5)
INHALED O2 CONCENTRATION: 36 %
INHALED O2 CONCENTRATION: 40 %
INHALED O2 FLOW RATE: 12 L/MIN (ref 0–30)
LYMPHOCYTES NFR BLD AUTO: 0.6 K/UL (ref 0.8–4.8)
LYMPHOCYTES NFR BLD AUTO: 9.3 % (ref 20–44)
MAGNESIUM SERPL-MCNC: 1.5 MG/DL (ref 1.8–2.4)
MCHC RBC AUTO-ENTMCNC: 34 G/DL (ref 31–36)
MCV RBC AUTO: 101 FL (ref 80–96)
MONOCYTES NFR BLD AUTO: 0.9 K/UL (ref 0.1–1.3)
MONOCYTES NFR BLD AUTO: 13.8 % (ref 2–12)
NEUTROPHILS # BLD AUTO: 4.9 K/UL (ref 1.8–8.9)
NEUTROPHILS NFR BLD AUTO: 75.8 % (ref 43–81)
PCO2 TEMP ADJ BLDA: 55.7 MMHG (ref 35–45)
PCO2 TEMP ADJ BLDA: 76.8 MMHG (ref 35–45)
PH TEMP ADJ BLDA: 7.27 [PH] (ref 7.35–7.45)
PH TEMP ADJ BLDA: 7.33 [PH] (ref 7.35–7.45)
PHOSPHATE SERPL-MCNC: 3.2 MG/DL (ref 2.5–4.9)
PLATELET # BLD AUTO: 235 K/UL (ref 150–450)
PO2 TEMP ADJ BLDA: 64.3 MMHG (ref 75–100)
PO2 TEMP ADJ BLDA: 75.1 MMHG (ref 75–100)
POTASSIUM SERPL-SCNC: 3.7 MMOL/L (ref 3.5–5.1)
RBC # BLD AUTO: 3.63 MIL/UL (ref 4.5–6)
SAO2 % BLDA: 92 % (ref 92–98.5)
SAO2 % BLDA: 94.1 % (ref 92–98.5)
SODIUM SERPL-SCNC: 141 MMOL/L (ref 136–145)
VENTILATION MODE VENT: (no result)
WBC NRBC COR # BLD AUTO: 6.5 K/UL (ref 4.3–11)

## 2021-09-28 RX ADMIN — METOPROLOL TARTRATE SCH MG: 50 TABLET, FILM COATED ORAL at 16:55

## 2021-09-28 RX ADMIN — TAMSULOSIN HYDROCHLORIDE SCH MG: 0.4 CAPSULE ORAL at 18:18

## 2021-09-28 RX ADMIN — MAGNESIUM SULFATE IN DEXTROSE SCH MLS/HR: 10 INJECTION, SOLUTION INTRAVENOUS at 13:21

## 2021-09-28 RX ADMIN — ALBUTEROL SULFATE SCH MG: 1.25 SOLUTION RESPIRATORY (INHALATION) at 04:28

## 2021-09-28 RX ADMIN — ALBUTEROL SULFATE SCH MG: 1.25 SOLUTION RESPIRATORY (INHALATION) at 08:00

## 2021-09-28 RX ADMIN — ALBUTEROL SULFATE SCH MG: 1.25 SOLUTION RESPIRATORY (INHALATION) at 11:29

## 2021-09-28 RX ADMIN — Medication SCH MG: at 22:45

## 2021-09-28 RX ADMIN — TOBRAMYCIN AND DEXAMETHASONE SCH DROP: 3; 1 SUSPENSION/ DROPS OPHTHALMIC at 20:38

## 2021-09-28 RX ADMIN — ALBUTEROL SULFATE SCH MG: 1.25 SOLUTION RESPIRATORY (INHALATION) at 19:49

## 2021-09-28 RX ADMIN — MAGNESIUM SULFATE IN DEXTROSE SCH MLS/HR: 10 INJECTION, SOLUTION INTRAVENOUS at 15:53

## 2021-09-28 RX ADMIN — GABAPENTIN SCH MG: 100 CAPSULE ORAL at 09:22

## 2021-09-28 RX ADMIN — Medication SCH MG: at 04:28

## 2021-09-28 RX ADMIN — Medication SCH MG: at 09:22

## 2021-09-28 RX ADMIN — PIPERACILLIN SODIUM AND TAZOBACTAM SODIUM SCH MLS/HR: .375; 3 INJECTION, POWDER, LYOPHILIZED, FOR SOLUTION INTRAVENOUS at 03:59

## 2021-09-28 RX ADMIN — PIPERACILLIN SODIUM AND TAZOBACTAM SODIUM SCH MLS/HR: .375; 3 INJECTION, POWDER, LYOPHILIZED, FOR SOLUTION INTRAVENOUS at 20:38

## 2021-09-28 RX ADMIN — Medication SCH MG: at 11:29

## 2021-09-28 RX ADMIN — GABAPENTIN SCH MG: 100 CAPSULE ORAL at 13:26

## 2021-09-28 RX ADMIN — Medication SCH MG: at 23:42

## 2021-09-28 RX ADMIN — Medication SCH MG: at 08:00

## 2021-09-28 RX ADMIN — TOBRAMYCIN AND DEXAMETHASONE SCH DROP: 3; 1 SUSPENSION/ DROPS OPHTHALMIC at 08:33

## 2021-09-28 RX ADMIN — PIPERACILLIN SODIUM AND TAZOBACTAM SODIUM SCH MLS/HR: .375; 3 INJECTION, POWDER, LYOPHILIZED, FOR SOLUTION INTRAVENOUS at 14:33

## 2021-09-28 RX ADMIN — TOBRAMYCIN AND DEXAMETHASONE SCH DROP: 3; 1 SUSPENSION/ DROPS OPHTHALMIC at 13:21

## 2021-09-28 RX ADMIN — Medication SCH MG: at 14:53

## 2021-09-28 RX ADMIN — FAMOTIDINE SCH MG: 20 TABLET, FILM COATED ORAL at 09:22

## 2021-09-28 RX ADMIN — GABAPENTIN SCH MG: 100 CAPSULE ORAL at 16:54

## 2021-09-28 RX ADMIN — TOBRAMYCIN AND DEXAMETHASONE SCH DROP: 3; 1 SUSPENSION/ DROPS OPHTHALMIC at 15:53

## 2021-09-28 RX ADMIN — DEXTROSE MONOHYDRATE SCH MLS/HR: 50 INJECTION, SOLUTION INTRAVENOUS at 08:50

## 2021-09-28 RX ADMIN — ALBUTEROL SULFATE SCH MG: 1.25 SOLUTION RESPIRATORY (INHALATION) at 23:42

## 2021-09-28 RX ADMIN — FERROUS SULFATE TAB 325 MG (65 MG ELEMENTAL FE) SCH MG: 325 (65 FE) TAB at 09:22

## 2021-09-28 RX ADMIN — ALBUTEROL SULFATE SCH MG: 1.25 SOLUTION RESPIRATORY (INHALATION) at 14:53

## 2021-09-28 RX ADMIN — METOPROLOL TARTRATE SCH MG: 50 TABLET, FILM COATED ORAL at 09:23

## 2021-09-28 RX ADMIN — ATORVASTATIN CALCIUM SCH MG: 40 TABLET, FILM COATED ORAL at 22:46

## 2021-09-28 RX ADMIN — FAMOTIDINE SCH MG: 20 TABLET, FILM COATED ORAL at 16:54

## 2021-09-28 RX ADMIN — Medication SCH MG: at 19:48

## 2021-09-28 RX ADMIN — FLUVOXAMINE MALEATE SCH MG: 50 TABLET, FILM COATED ORAL at 22:45

## 2021-09-28 RX ADMIN — Medication SCH MG: at 16:54

## 2021-09-28 NOTE — NUR
RN CLOSING NOTE-

PT AWAKE IN BED, BED IN LOW POSITION, CALL LIGHT WITHIN REACH, NO EVIDENCE OF PAIN AT THIS 
TIME. PATIENT IS  ON 02 AT 5LPM VIA NASAL CANULA , 02 MONITORING FOR DESATURATION . HOB AT 
45 DEGREE, ASPIRATION PRECAUTIONS, IV LINE AT RT HAND G 22 SL, ANDERS CATHETER DRAINING 
YELLOW UA,  COLOSTOMY BAG IN PLACE , PATIENT KEPT CLEAN AND DRY, WILL CONTINUE TO MONITOR.

## 2021-09-28 NOTE — NUR
RN NOTES:



PATIENT WAS OBSERVED LETHARGIC DR ON CALL MADE AWARE AND REQUEST AN ORDER OF REPEAT ABG PER 
RT RT REQUEST, PATIENT WAS PLACED ON BIPAP WITH SETTINGS AT  22/5 ,30%, RR-16 TO ACHIEVED 
 PERCENT O2 SAT AT 0430 PATIENT WAS OBSERVED MORE AWAKE AND RESPONSIVE, AND MORE ALERT 
AND ATTEMPT TO REMOVED BI-PAP, RT REPLACE IT WITH SIMPPLE MASK, O2 SAT REMAINS  BET WEEN 
93-95 AT 5LPM, NO COMPLAIN OF PAIN AND DISCOMFORT NO SOB NOTED, WILL CONTINUE TO MONITOR.

## 2021-09-28 NOTE — NUR
TELE RN CLOSING NOTES:



PATIENT AWAKE IN BED, BED IN LOW POSITION, CALL LIGHTS WITHIN REACH, NO COMPLAIN OF PAIN AND 
DISCOMFORT AT THIS TIME, PATIENT WAS ON SIMPLE FACE MASK AT 5LPM WITH 94 PERCENT SATURATION, 
HOB AT 45 DEGREE, PATIENT ON ANDERS CATHETER WITH 800CC URINE OUTPUT, ,200CC COLOSTOMY BAG 
OUTPUT, PATIENT IS MORE ALERT THAN LETHARGIC COMPARE TO LAST NIGHT, BED IN LOW POSITION, 
CALL LIGHTS WITHIN REACH, ENDORSE TO INCOMING SHIFT.

## 2021-09-28 NOTE — NUR
RN OPENING NOTE-

PT AWAKE IN BED, BED IN LOW POSITION, CALL LIGHT WITHIN REACH, NO EVIDENCE OF PAIN AT THIS 
TIME. PATIENT IS  ON 02 AT 5LPM VIA NASAL CANULA MOUTH, 02 MONITORING FOR DESATURATION . HOB 
AT 45 DEGREE, ASPIRATION PRECAUTIONS, IV LINE AT RT HAND G 22 SL, ANDERS CATHETER DRAINING 
YELLOW UA,  COLOSTOMY BAG IN PLACE , PATIENT KEPT CLEAN AND DRY, WILL CONTINUE TO MONITOR.

## 2021-09-28 NOTE — NUR
RT NOTE

LATE ENTRY: Pt on Venti mask @ 40% fio2. ABG taken and critical results given and reported 
to MD. Pt placed on bipap on noted settings as charted per MD orders. mepilex in place, no 
redness or scarring noted. Alarms are set and audibe. Ambu bag bedside. Bipap plugged into 
red outlet. Will continue to monitor closely.

## 2021-09-28 NOTE — NUR
RT NOTE



PT REMOVED BIPAP. EXPLAINED TO PATIENT THE USE BUT REFUSING TO KEEP BIPAP ON. PATIENT IS 
AWAKE, ALERT AND ORIENTED. NO SIGNS OF RESPIRATORY DISTRESS NOTED AT THIS TIME. PATIENT 
PLACED ON VENTI MASK @ 30%. PRIMARY NURSE AND CHARGED NURSE IS AWARE. BIPAP IS STANDBY AT 
THIS MOMENT. WILL CONTINUE TO MONITOR THE PATIENT.

## 2021-09-28 NOTE — NUR
RT NOTE



PATIENT O2 SATURATION DECREASE TO 81-84% WHILE ASLEEP. PATIENT MOUTH BREATHING NOTED. 
PATIENT PLACED ON VENTURI MASK 40% FIO2. SPO2 IMPROVED TO 93%. WILL CONTINUE TO MONITOR.

## 2021-09-29 VITALS — DIASTOLIC BLOOD PRESSURE: 67 MMHG | SYSTOLIC BLOOD PRESSURE: 164 MMHG

## 2021-09-29 VITALS — DIASTOLIC BLOOD PRESSURE: 76 MMHG | SYSTOLIC BLOOD PRESSURE: 161 MMHG

## 2021-09-29 VITALS — DIASTOLIC BLOOD PRESSURE: 66 MMHG | SYSTOLIC BLOOD PRESSURE: 114 MMHG

## 2021-09-29 VITALS — DIASTOLIC BLOOD PRESSURE: 77 MMHG | SYSTOLIC BLOOD PRESSURE: 138 MMHG

## 2021-09-29 VITALS — SYSTOLIC BLOOD PRESSURE: 140 MMHG | DIASTOLIC BLOOD PRESSURE: 82 MMHG

## 2021-09-29 VITALS — SYSTOLIC BLOOD PRESSURE: 140 MMHG | DIASTOLIC BLOOD PRESSURE: 63 MMHG

## 2021-09-29 VITALS — SYSTOLIC BLOOD PRESSURE: 150 MMHG | DIASTOLIC BLOOD PRESSURE: 75 MMHG

## 2021-09-29 VITALS — SYSTOLIC BLOOD PRESSURE: 140 MMHG | DIASTOLIC BLOOD PRESSURE: 74 MMHG

## 2021-09-29 VITALS — SYSTOLIC BLOOD PRESSURE: 140 MMHG | DIASTOLIC BLOOD PRESSURE: 72 MMHG

## 2021-09-29 VITALS — DIASTOLIC BLOOD PRESSURE: 68 MMHG | SYSTOLIC BLOOD PRESSURE: 136 MMHG

## 2021-09-29 VITALS — DIASTOLIC BLOOD PRESSURE: 66 MMHG | SYSTOLIC BLOOD PRESSURE: 139 MMHG

## 2021-09-29 VITALS — SYSTOLIC BLOOD PRESSURE: 142 MMHG | DIASTOLIC BLOOD PRESSURE: 75 MMHG

## 2021-09-29 VITALS — SYSTOLIC BLOOD PRESSURE: 128 MMHG | DIASTOLIC BLOOD PRESSURE: 66 MMHG

## 2021-09-29 VITALS — DIASTOLIC BLOOD PRESSURE: 74 MMHG | SYSTOLIC BLOOD PRESSURE: 140 MMHG

## 2021-09-29 LAB
BASE EXCESS BLDA CALC-SCNC: 5.4 MMOL/L
BASE EXCESS BLDA CALC-SCNC: 8.5 MMOL/L
BASOPHILS # BLD AUTO: 0 K/UL (ref 0–0.2)
BASOPHILS NFR BLD AUTO: 0.3 % (ref 0–2)
BUN SERPL-MCNC: 11 MG/DL (ref 7–18)
CALCIUM SERPL-MCNC: 8.5 MG/DL (ref 8.5–10.1)
CHLORIDE SERPL-SCNC: 102 MMOL/L (ref 98–107)
CO2 SERPL-SCNC: 37 MMOL/L (ref 21–32)
CREAT SERPL-MCNC: 0.7 MG/DL (ref 0.6–1.3)
DO-HGB MFR BLDA: 55.6 MMHG
DO-HGB MFR BLDA: 83.9 MMHG
EOSINOPHIL NFR BLD AUTO: 0.6 % (ref 0–6)
GLUCOSE SERPL-MCNC: 128 MG/DL (ref 74–106)
HCT VFR BLD AUTO: 37 % (ref 39–51)
HGB BLD-MCNC: 12.2 G/DL (ref 13.5–17.5)
INHALED O2 CONCENTRATION: 30 %
INHALED O2 CONCENTRATION: 36 %
LYMPHOCYTES NFR BLD AUTO: 0.5 K/UL (ref 0.8–4.8)
LYMPHOCYTES NFR BLD AUTO: 6.6 % (ref 20–44)
LYMPHOCYTES NFR BLD MANUAL: 3 % (ref 16–48)
MAGNESIUM SERPL-MCNC: 1.9 MG/DL (ref 1.8–2.4)
MCHC RBC AUTO-ENTMCNC: 33 G/DL (ref 31–36)
MCV RBC AUTO: 101 FL (ref 80–96)
MONOCYTES NFR BLD AUTO: 1.2 K/UL (ref 0.1–1.3)
MONOCYTES NFR BLD AUTO: 15.4 % (ref 2–12)
MONOCYTES NFR BLD MANUAL: 11 % (ref 0–11)
NEUTROPHILS # BLD AUTO: 6.2 K/UL (ref 1.8–8.9)
NEUTROPHILS NFR BLD AUTO: 77.1 % (ref 43–81)
NEUTS SEG NFR BLD MANUAL: 86 % (ref 42–76)
PCO2 TEMP ADJ BLDA: 42 MMHG (ref 35–45)
PCO2 TEMP ADJ BLDA: 95.2 MMHG (ref 35–45)
PH TEMP ADJ BLDA: 7.24 [PH] (ref 7.35–7.45)
PH TEMP ADJ BLDA: 7.47 [PH] (ref 7.35–7.45)
PHOSPHATE SERPL-MCNC: 3.7 MG/DL (ref 2.5–4.9)
PLATELET # BLD AUTO: 228 K/UL (ref 150–450)
PO2 TEMP ADJ BLDA: 80.7 MMHG (ref 75–100)
PO2 TEMP ADJ BLDA: 90.7 MMHG (ref 75–100)
POTASSIUM SERPL-SCNC: 3.7 MMOL/L (ref 3.5–5.1)
RBC # BLD AUTO: 3.64 MIL/UL (ref 4.5–6)
SAO2 % BLDA: 96.2 % (ref 92–98.5)
SAO2 % BLDA: 96.3 % (ref 92–98.5)
SODIUM SERPL-SCNC: 142 MMOL/L (ref 136–145)
VENTILATION MODE VENT: (no result)
VENTILATION MODE VENT: (no result)
WBC NRBC COR # BLD AUTO: 8 K/UL (ref 4.3–11)

## 2021-09-29 RX ADMIN — FLUVOXAMINE MALEATE SCH MG: 50 TABLET, FILM COATED ORAL at 21:44

## 2021-09-29 RX ADMIN — GABAPENTIN SCH MG: 100 CAPSULE ORAL at 13:00

## 2021-09-29 RX ADMIN — TOBRAMYCIN AND DEXAMETHASONE SCH DROP: 3; 1 SUSPENSION/ DROPS OPHTHALMIC at 20:00

## 2021-09-29 RX ADMIN — METOPROLOL TARTRATE SCH MG: 50 TABLET, FILM COATED ORAL at 17:00

## 2021-09-29 RX ADMIN — ALBUTEROL SULFATE SCH MG: 1.25 SOLUTION RESPIRATORY (INHALATION) at 23:24

## 2021-09-29 RX ADMIN — GABAPENTIN SCH MG: 100 CAPSULE ORAL at 08:58

## 2021-09-29 RX ADMIN — Medication SCH MG: at 08:19

## 2021-09-29 RX ADMIN — Medication SCH MG: at 15:46

## 2021-09-29 RX ADMIN — METOPROLOL TARTRATE SCH MG: 50 TABLET, FILM COATED ORAL at 08:58

## 2021-09-29 RX ADMIN — FAMOTIDINE SCH MG: 20 TABLET, FILM COATED ORAL at 17:00

## 2021-09-29 RX ADMIN — FAMOTIDINE SCH MG: 20 TABLET, FILM COATED ORAL at 08:58

## 2021-09-29 RX ADMIN — DEXTROSE MONOHYDRATE SCH MLS/HR: 50 INJECTION, SOLUTION INTRAVENOUS at 08:59

## 2021-09-29 RX ADMIN — FERROUS SULFATE TAB 325 MG (65 MG ELEMENTAL FE) SCH MG: 325 (65 FE) TAB at 08:58

## 2021-09-29 RX ADMIN — ATORVASTATIN CALCIUM SCH MG: 40 TABLET, FILM COATED ORAL at 21:44

## 2021-09-29 RX ADMIN — Medication SCH MG: at 11:23

## 2021-09-29 RX ADMIN — GABAPENTIN SCH MG: 100 CAPSULE ORAL at 17:00

## 2021-09-29 RX ADMIN — Medication SCH MG: at 21:44

## 2021-09-29 RX ADMIN — ALBUTEROL SULFATE SCH MG: 1.25 SOLUTION RESPIRATORY (INHALATION) at 03:47

## 2021-09-29 RX ADMIN — Medication SCH MG: at 03:47

## 2021-09-29 RX ADMIN — TOBRAMYCIN AND DEXAMETHASONE SCH DROP: 3; 1 SUSPENSION/ DROPS OPHTHALMIC at 07:35

## 2021-09-29 RX ADMIN — ALBUTEROL SULFATE SCH MG: 1.25 SOLUTION RESPIRATORY (INHALATION) at 19:52

## 2021-09-29 RX ADMIN — TOBRAMYCIN AND DEXAMETHASONE SCH DROP: 3; 1 SUSPENSION/ DROPS OPHTHALMIC at 16:52

## 2021-09-29 RX ADMIN — TAMSULOSIN HYDROCHLORIDE SCH MG: 0.4 CAPSULE ORAL at 18:00

## 2021-09-29 RX ADMIN — Medication SCH MG: at 23:24

## 2021-09-29 RX ADMIN — TOBRAMYCIN AND DEXAMETHASONE SCH DROP: 3; 1 SUSPENSION/ DROPS OPHTHALMIC at 16:53

## 2021-09-29 RX ADMIN — ALBUTEROL SULFATE SCH MG: 1.25 SOLUTION RESPIRATORY (INHALATION) at 15:46

## 2021-09-29 RX ADMIN — ALBUTEROL SULFATE SCH MG: 1.25 SOLUTION RESPIRATORY (INHALATION) at 08:19

## 2021-09-29 RX ADMIN — PIPERACILLIN SODIUM AND TAZOBACTAM SODIUM SCH MLS/HR: .375; 3 INJECTION, POWDER, LYOPHILIZED, FOR SOLUTION INTRAVENOUS at 20:00

## 2021-09-29 RX ADMIN — Medication SCH MG: at 17:00

## 2021-09-29 RX ADMIN — ALBUTEROL SULFATE SCH MG: 1.25 SOLUTION RESPIRATORY (INHALATION) at 11:23

## 2021-09-29 RX ADMIN — Medication SCH MG: at 19:52

## 2021-09-29 RX ADMIN — PIPERACILLIN SODIUM AND TAZOBACTAM SODIUM SCH MLS/HR: .375; 3 INJECTION, POWDER, LYOPHILIZED, FOR SOLUTION INTRAVENOUS at 16:14

## 2021-09-29 RX ADMIN — Medication SCH MG: at 08:55

## 2021-09-29 RX ADMIN — PIPERACILLIN SODIUM AND TAZOBACTAM SODIUM SCH MLS/HR: .375; 3 INJECTION, POWDER, LYOPHILIZED, FOR SOLUTION INTRAVENOUS at 04:25

## 2021-09-29 NOTE — NUR
ICU NOTES



Patient due medications ( crushed) given with apple sauce well tolerated.Aspiration 
precaution observed.

## 2021-09-29 NOTE — NUR
ALLAN NOTES



PT ON BIPAP WILL HOLD LOPRESSOR BP - 135/59. 

-------------------------------------------------------------------------------

Addendum: 09/29/21 at 1745 by KAUSHIK HAWK RN

-------------------------------------------------------------------------------

HR 90

## 2021-09-29 NOTE — NUR
NURSING NOTES



PT TRANSFERRED TO ICU FROM 3W. PT ON BIPAP AND TELE. SAT >95%, HR 74. CURRENT BIPAP SETTINGS 
22/5, BUR 16, AND FI02 30%

## 2021-09-29 NOTE — NUR
RN CLOSING NOTES



PT ALTERED AND PULLS ON BIPAP. REQUIRES FREQUENT ORIENTATION TO NOT PULL BIPAP. PT HAS 
SLIGHT SACRAL REDNESS. ON SOFT DIET WITH CRUSHED MEDS. PT RECEIVED MIDLINE TODAY @1300. 
MIDLINE FLUSHING AND INTACT. PLAN IS TO CONTINUE ABX AND BIPAP. WILL CONTINUE TO MONITOR

## 2021-09-29 NOTE — NUR
RN NOTE



GABAPENTIN NOT ADMINISTERED DUE TO PT ON BIPAP.

VANCOMYCIN IS RESUMED, SPOKE TO PHARMACY WILL ADMINISTER PIPERACILLIN AFTER VANCO IS 
COMPLETE.

## 2021-09-29 NOTE — NUR
Received patient with Dx: Respiratory failure,PNA,CHF.Awake alert  oriented x1 otherwise 

confused and disoriented.Reoriented to place and time. Uncooperative pulling out BIPAP 

frequently despite explanation.Bilateral soft wrist restraints applied for safety  as 
ordered.

Respiration even and unlabored no acute distress noted.RT at bedside reapplied Bipap with

prescribed settings.Saturation 96-98%.Aspiration precaution maintained.HOB elevated.Denies 

pain or discomfort.Turned and repositioned.Safety precaution maintained.Call light at 
bedside.

## 2021-09-29 NOTE — NUR
MS RN CLOSING NOTES:



PATIENT SLEEP IN BED COMFORTABLY, BED IN LOW POSITION, CALL LIGHTS WITHIN REACH, NO COMPLAIN 
OF PAIN AND DISCOMFORT AT THIS TIME, PT, ON COLOSTOMY BAG 200CC, ANDERS CATHETER 300CC, 
PATIENT WITH IV LINE AT RFA#22 SL, HOB AT 45 DEGREE, WITH 4LPM  VIA NASAL CANNULA, PATIENT 
REMIND TO DO DEEP BREATHING WHEN SATURATION WAS DROPPING NOW AT 97% SATURATION, PATIENT KEPT 
CLEAN AND SHIRA, ALL NEEDS MET, ENDORSE TO INCOMING SHIFT.

## 2021-09-30 VITALS — SYSTOLIC BLOOD PRESSURE: 119 MMHG | DIASTOLIC BLOOD PRESSURE: 65 MMHG

## 2021-09-30 VITALS — SYSTOLIC BLOOD PRESSURE: 132 MMHG | DIASTOLIC BLOOD PRESSURE: 69 MMHG

## 2021-09-30 VITALS — SYSTOLIC BLOOD PRESSURE: 114 MMHG | DIASTOLIC BLOOD PRESSURE: 64 MMHG

## 2021-09-30 VITALS — DIASTOLIC BLOOD PRESSURE: 65 MMHG | SYSTOLIC BLOOD PRESSURE: 110 MMHG

## 2021-09-30 VITALS — DIASTOLIC BLOOD PRESSURE: 40 MMHG | SYSTOLIC BLOOD PRESSURE: 115 MMHG

## 2021-09-30 VITALS — DIASTOLIC BLOOD PRESSURE: 72 MMHG | SYSTOLIC BLOOD PRESSURE: 137 MMHG

## 2021-09-30 VITALS — SYSTOLIC BLOOD PRESSURE: 122 MMHG | DIASTOLIC BLOOD PRESSURE: 54 MMHG

## 2021-09-30 VITALS — DIASTOLIC BLOOD PRESSURE: 80 MMHG | SYSTOLIC BLOOD PRESSURE: 143 MMHG

## 2021-09-30 VITALS — DIASTOLIC BLOOD PRESSURE: 62 MMHG | SYSTOLIC BLOOD PRESSURE: 118 MMHG

## 2021-09-30 VITALS — DIASTOLIC BLOOD PRESSURE: 53 MMHG | SYSTOLIC BLOOD PRESSURE: 94 MMHG

## 2021-09-30 VITALS — SYSTOLIC BLOOD PRESSURE: 125 MMHG | DIASTOLIC BLOOD PRESSURE: 69 MMHG

## 2021-09-30 VITALS — SYSTOLIC BLOOD PRESSURE: 140 MMHG | DIASTOLIC BLOOD PRESSURE: 78 MMHG

## 2021-09-30 VITALS — SYSTOLIC BLOOD PRESSURE: 104 MMHG | DIASTOLIC BLOOD PRESSURE: 52 MMHG

## 2021-09-30 VITALS — DIASTOLIC BLOOD PRESSURE: 83 MMHG | SYSTOLIC BLOOD PRESSURE: 144 MMHG

## 2021-09-30 VITALS — SYSTOLIC BLOOD PRESSURE: 117 MMHG | DIASTOLIC BLOOD PRESSURE: 62 MMHG

## 2021-09-30 VITALS — SYSTOLIC BLOOD PRESSURE: 111 MMHG | DIASTOLIC BLOOD PRESSURE: 72 MMHG

## 2021-09-30 VITALS — DIASTOLIC BLOOD PRESSURE: 52 MMHG | SYSTOLIC BLOOD PRESSURE: 108 MMHG

## 2021-09-30 VITALS — DIASTOLIC BLOOD PRESSURE: 51 MMHG | SYSTOLIC BLOOD PRESSURE: 114 MMHG

## 2021-09-30 VITALS — DIASTOLIC BLOOD PRESSURE: 58 MMHG | SYSTOLIC BLOOD PRESSURE: 116 MMHG

## 2021-09-30 VITALS — SYSTOLIC BLOOD PRESSURE: 133 MMHG | DIASTOLIC BLOOD PRESSURE: 76 MMHG

## 2021-09-30 VITALS — DIASTOLIC BLOOD PRESSURE: 69 MMHG | SYSTOLIC BLOOD PRESSURE: 111 MMHG

## 2021-09-30 VITALS — DIASTOLIC BLOOD PRESSURE: 71 MMHG | SYSTOLIC BLOOD PRESSURE: 136 MMHG

## 2021-09-30 VITALS — SYSTOLIC BLOOD PRESSURE: 120 MMHG | DIASTOLIC BLOOD PRESSURE: 54 MMHG

## 2021-09-30 VITALS — SYSTOLIC BLOOD PRESSURE: 136 MMHG | DIASTOLIC BLOOD PRESSURE: 74 MMHG

## 2021-09-30 LAB
BASE EXCESS BLDA CALC-SCNC: 11.9 MMOL/L
BASOPHILS # BLD AUTO: 0 K/UL (ref 0–0.2)
BASOPHILS NFR BLD AUTO: 0.3 % (ref 0–2)
BUN SERPL-MCNC: 13 MG/DL (ref 7–18)
CALCIUM SERPL-MCNC: 8.8 MG/DL (ref 8.5–10.1)
CHLORIDE SERPL-SCNC: 101 MMOL/L (ref 98–107)
CO2 SERPL-SCNC: 37 MMOL/L (ref 21–32)
CREAT SERPL-MCNC: 0.9 MG/DL (ref 0.6–1.3)
DO-HGB MFR BLDA: 76.7 MMHG
EOSINOPHIL NFR BLD AUTO: 0.6 % (ref 0–6)
GLUCOSE SERPL-MCNC: 138 MG/DL (ref 74–106)
HCT VFR BLD AUTO: 35 % (ref 39–51)
HGB BLD-MCNC: 11.8 G/DL (ref 13.5–17.5)
INHALED O2 CONCENTRATION: 28 %
LYMPHOCYTES NFR BLD AUTO: 0.6 K/UL (ref 0.8–4.8)
LYMPHOCYTES NFR BLD AUTO: 8.3 % (ref 20–44)
LYMPHOCYTES NFR BLD MANUAL: 9 % (ref 16–48)
MAGNESIUM SERPL-MCNC: 1.7 MG/DL (ref 1.8–2.4)
MCHC RBC AUTO-ENTMCNC: 34 G/DL (ref 31–36)
MCV RBC AUTO: 100 FL (ref 80–96)
MONOCYTES NFR BLD AUTO: 1.2 K/UL (ref 0.1–1.3)
MONOCYTES NFR BLD AUTO: 15.8 % (ref 2–12)
MONOCYTES NFR BLD MANUAL: 10 % (ref 0–11)
NEUTROPHILS # BLD AUTO: 5.7 K/UL (ref 1.8–8.9)
NEUTROPHILS NFR BLD AUTO: 75 % (ref 43–81)
NEUTS SEG NFR BLD MANUAL: 81 % (ref 42–76)
PCO2 TEMP ADJ BLDA: 58.1 MMHG (ref 35–45)
PH TEMP ADJ BLDA: 7.44 [PH] (ref 7.35–7.45)
PHOSPHATE SERPL-MCNC: 2.1 MG/DL (ref 2.5–4.9)
PLATELET # BLD AUTO: 232 K/UL (ref 150–450)
PO2 TEMP ADJ BLDA: 54.4 MMHG (ref 75–100)
POTASSIUM SERPL-SCNC: 3.3 MMOL/L (ref 3.5–5.1)
RBC # BLD AUTO: 3.51 MIL/UL (ref 4.5–6)
SAO2 % BLDA: 89.3 % (ref 92–98.5)
SODIUM SERPL-SCNC: 142 MMOL/L (ref 136–145)
VENTILATION MODE VENT: (no result)
WBC NRBC COR # BLD AUTO: 7.7 K/UL (ref 4.3–11)

## 2021-09-30 RX ADMIN — FAMOTIDINE SCH MG: 20 TABLET, FILM COATED ORAL at 16:35

## 2021-09-30 RX ADMIN — MAGNESIUM SULFATE IN DEXTROSE SCH MLS/HR: 10 INJECTION, SOLUTION INTRAVENOUS at 11:51

## 2021-09-30 RX ADMIN — Medication SCH MG: at 08:04

## 2021-09-30 RX ADMIN — GABAPENTIN SCH MG: 100 CAPSULE ORAL at 16:35

## 2021-09-30 RX ADMIN — GABAPENTIN SCH MG: 100 CAPSULE ORAL at 08:41

## 2021-09-30 RX ADMIN — Medication SCH MG: at 19:36

## 2021-09-30 RX ADMIN — ACETAMINOPHEN PRN MG: 325 TABLET ORAL at 16:35

## 2021-09-30 RX ADMIN — PIPERACILLIN SODIUM AND TAZOBACTAM SODIUM SCH MLS/HR: .375; 3 INJECTION, POWDER, LYOPHILIZED, FOR SOLUTION INTRAVENOUS at 04:00

## 2021-09-30 RX ADMIN — Medication SCH MG: at 11:29

## 2021-09-30 RX ADMIN — ALBUTEROL SULFATE SCH MG: 1.25 SOLUTION RESPIRATORY (INHALATION) at 11:29

## 2021-09-30 RX ADMIN — Medication SCH MG: at 21:08

## 2021-09-30 RX ADMIN — Medication SCH MG: at 23:14

## 2021-09-30 RX ADMIN — ALBUTEROL SULFATE SCH MG: 1.25 SOLUTION RESPIRATORY (INHALATION) at 19:36

## 2021-09-30 RX ADMIN — MAGNESIUM SULFATE IN DEXTROSE SCH MLS/HR: 10 INJECTION, SOLUTION INTRAVENOUS at 10:45

## 2021-09-30 RX ADMIN — GABAPENTIN SCH MG: 100 CAPSULE ORAL at 13:39

## 2021-09-30 RX ADMIN — PIPERACILLIN SODIUM AND TAZOBACTAM SODIUM SCH MLS/HR: .375; 3 INJECTION, POWDER, LYOPHILIZED, FOR SOLUTION INTRAVENOUS at 20:23

## 2021-09-30 RX ADMIN — TRAMADOL HYDROCHLORIDE PRN MG: 50 TABLET, FILM COATED ORAL at 09:29

## 2021-09-30 RX ADMIN — Medication SCH MG: at 16:35

## 2021-09-30 RX ADMIN — FERROUS SULFATE TAB 325 MG (65 MG ELEMENTAL FE) SCH MG: 325 (65 FE) TAB at 08:41

## 2021-09-30 RX ADMIN — Medication SCH MG: at 15:50

## 2021-09-30 RX ADMIN — ALBUTEROL SULFATE SCH MG: 1.25 SOLUTION RESPIRATORY (INHALATION) at 23:14

## 2021-09-30 RX ADMIN — ATORVASTATIN CALCIUM SCH MG: 40 TABLET, FILM COATED ORAL at 21:08

## 2021-09-30 RX ADMIN — SODIUM CHLORIDE PRN MLS/HR: 9 INJECTION, SOLUTION INTRAVENOUS at 05:57

## 2021-09-30 RX ADMIN — ALBUTEROL SULFATE SCH MG: 1.25 SOLUTION RESPIRATORY (INHALATION) at 15:49

## 2021-09-30 RX ADMIN — METOPROLOL TARTRATE SCH MG: 50 TABLET, FILM COATED ORAL at 08:41

## 2021-09-30 RX ADMIN — FLUVOXAMINE MALEATE SCH MG: 50 TABLET, FILM COATED ORAL at 21:08

## 2021-09-30 RX ADMIN — TOBRAMYCIN AND DEXAMETHASONE SCH DROP: 3; 1 SUSPENSION/ DROPS OPHTHALMIC at 19:39

## 2021-09-30 RX ADMIN — TOBRAMYCIN AND DEXAMETHASONE SCH DROP: 3; 1 SUSPENSION/ DROPS OPHTHALMIC at 16:01

## 2021-09-30 RX ADMIN — FAMOTIDINE SCH MG: 20 TABLET, FILM COATED ORAL at 08:41

## 2021-09-30 RX ADMIN — TOBRAMYCIN AND DEXAMETHASONE SCH DROP: 3; 1 SUSPENSION/ DROPS OPHTHALMIC at 11:30

## 2021-09-30 RX ADMIN — TOBRAMYCIN AND DEXAMETHASONE SCH DROP: 3; 1 SUSPENSION/ DROPS OPHTHALMIC at 08:40

## 2021-09-30 RX ADMIN — TAMSULOSIN HYDROCHLORIDE SCH MG: 0.4 CAPSULE ORAL at 17:56

## 2021-09-30 RX ADMIN — METOPROLOL TARTRATE SCH MG: 50 TABLET, FILM COATED ORAL at 16:36

## 2021-09-30 RX ADMIN — DEXTROSE MONOHYDRATE SCH MLS/HR: 50 INJECTION, SOLUTION INTRAVENOUS at 08:40

## 2021-09-30 RX ADMIN — Medication SCH MG: at 03:20

## 2021-09-30 RX ADMIN — ALBUTEROL SULFATE SCH MG: 1.25 SOLUTION RESPIRATORY (INHALATION) at 08:04

## 2021-09-30 RX ADMIN — PIPERACILLIN SODIUM AND TAZOBACTAM SODIUM SCH MLS/HR: .375; 3 INJECTION, POWDER, LYOPHILIZED, FOR SOLUTION INTRAVENOUS at 12:31

## 2021-09-30 RX ADMIN — Medication SCH MG: at 08:41

## 2021-09-30 RX ADMIN — ALBUTEROL SULFATE SCH MG: 1.25 SOLUTION RESPIRATORY (INHALATION) at 03:20

## 2021-09-30 NOTE — NUR
ICU NOTES.



Patient awake  Bed bath rendered for comfort.VS stable.Turned and reposition.no acute 
distress noted.

## 2021-09-30 NOTE — NUR
RN OPENING NOTE



PATIENT IN BED, BIPAP ON WITH NO SIGNS OF LABORED BREATHING. A&O X1-2, CONFUSED AT THIS 
TIME. TELE WITH NORMAL SINUS RHYTHM. LEFT MIDLINE 20G IN PLACE. COLOSTOMY BAG AND ANDERS CATH 
IN PLACE. BED LOCKED AND IN LOWEST POSITION, CALL LIGHT WITHIN REACH, 3 SIDE RAILS UP. ALL 
SAFETY MEASURES IMPLEMENTED. WILL CONTIUE TO MONITOR THROUGHOUT SHIFT.

## 2021-09-30 NOTE — NUR
ICU NOTES 

 

Patient resting VS remains stable.SR.Tolerating BIPAP.No significant changes noted during 

the shift.Turned and repositioned q 2 hrs off loading pressure points.Colostomy care done.

Adequate BM.Due medication given.Will endorse to day shift for further care and management.

## 2021-09-30 NOTE — NUR
RN NOTES



FACILITATED CHANGE OF COLOSTOMY BAG; FECAL OUTPUT NOTED @60ML. NEW COLOSTOMY BAG SECURED AND 
IN PLACE; NO SIGNS OF LEAKAGE. WILL CONTINUE TO ASSESS AND MONITOR THROUGHOUT THE SHIFT.

## 2021-09-30 NOTE — NUR
RN OPENING NOTES:



RECEIVED PT A/OX3-4 IN BED RESTING COMFORTABLY. PATIENT IN NO S/SX OF ACUTE DISTRESS AT THIS 
TIME. NO SOB NOTED. PATIENT'S BREATHING IS EVEN AND UNLABORED. PATIENT IS ON 2L OF OXYGEN 
VIA NC; TOLERATING WELL. PATIENT ON TELE MONITORING READING SINUS RHYTHM HR IS @70s AT THE 
TIME OF RECEIVED. PT CURRENTLY ON SOFT DIET.  NOTED IV SITE ON L UA MIDLINE #18 ; PATENT, 
INTACT AND FLUSHING WELL; NO S/S OF INFECTION OR INFILTRATION.  PT HAS COLOSTOMY AT R LOWER 
ABDOMEN IN PLACE AND INTACT.WITH ANDERS CATH IN PLACE, MODERATE URINE OUTPUT NOTED AT THIS 
TIME. SAFETY MEASURES HAVE BEEN PROVIDED AND IMPLEMENTED. PATIENT BED ALARM IS ON. HEAD OF 
BED ELEVATED. BED IS LOCKED,  IN LOWEST POSITION AND SIDE RAILS UP. CALL LIGHT WITHIN REACH 
OF THE PATIENT. APPLICABLE ISOLATION PRECAUTIONS IN PLACE. WILL CONTINUE TO MONITOR AND 
REASSESS FOR ANY CHANGES AND WILL CARRY OUT ANY ONGOING AND ACTIVE MD ORDER.

## 2021-09-30 NOTE — NUR
RN CLOSING NOTE



PATIENT IN BED, RESTING. A&OX1-2, COOPERATIVE BUT CONFUSED. LEFT MIDLINE 20G IN PLACE. 
COLOSTOMY BAG AND ANDERS CATH IN PLACE AND PATENT. BED LOCKED AND IN LOWEST POSITION, 3 SIDE 
RAILS UP AND CALL LIGHT WITHIN REACH. ALL SAFETY MEASURES IMPLEMENTED. WILL ENDORSE TO NIGHT 
SHIFT NURSE.

## 2021-09-30 NOTE — NUR
RN NOTES



PT NOTED TO BE RESTLESS AND AGITATED; TRYING TO REMOVE TUBES AND LINES. CHARGE RN WELL 
AWARE. NOTIFIED ONCALL MD (BRAVO) SECURED ORDER FOR ATIVAN PRN. WILL CARRY OUT ORDER AS 
NEEDED.

## 2021-09-30 NOTE — NUR
RN NOTES



RT COORDINATED THAT PT HOOKED TO NOCTURNAL BIPAP; CURRENT O2 SAT OF PT IS AT 98%. CHARGE RN 
MADE AWARE. WILL CONTINUE TO ASSESS AND MONITOR THROUGHOUT THE SHIFT.

## 2021-10-01 VITALS — SYSTOLIC BLOOD PRESSURE: 143 MMHG | DIASTOLIC BLOOD PRESSURE: 69 MMHG

## 2021-10-01 VITALS — SYSTOLIC BLOOD PRESSURE: 138 MMHG | DIASTOLIC BLOOD PRESSURE: 74 MMHG

## 2021-10-01 VITALS — DIASTOLIC BLOOD PRESSURE: 65 MMHG | SYSTOLIC BLOOD PRESSURE: 117 MMHG

## 2021-10-01 VITALS — SYSTOLIC BLOOD PRESSURE: 133 MMHG | DIASTOLIC BLOOD PRESSURE: 60 MMHG

## 2021-10-01 VITALS — DIASTOLIC BLOOD PRESSURE: 60 MMHG | SYSTOLIC BLOOD PRESSURE: 133 MMHG

## 2021-10-01 VITALS — DIASTOLIC BLOOD PRESSURE: 73 MMHG | SYSTOLIC BLOOD PRESSURE: 130 MMHG

## 2021-10-01 VITALS — DIASTOLIC BLOOD PRESSURE: 66 MMHG | SYSTOLIC BLOOD PRESSURE: 126 MMHG

## 2021-10-01 VITALS — SYSTOLIC BLOOD PRESSURE: 124 MMHG | DIASTOLIC BLOOD PRESSURE: 61 MMHG

## 2021-10-01 VITALS — SYSTOLIC BLOOD PRESSURE: 120 MMHG | DIASTOLIC BLOOD PRESSURE: 61 MMHG

## 2021-10-01 VITALS — SYSTOLIC BLOOD PRESSURE: 145 MMHG | DIASTOLIC BLOOD PRESSURE: 83 MMHG

## 2021-10-01 VITALS — DIASTOLIC BLOOD PRESSURE: 75 MMHG | SYSTOLIC BLOOD PRESSURE: 128 MMHG

## 2021-10-01 VITALS — SYSTOLIC BLOOD PRESSURE: 129 MMHG | DIASTOLIC BLOOD PRESSURE: 66 MMHG

## 2021-10-01 VITALS — SYSTOLIC BLOOD PRESSURE: 130 MMHG | DIASTOLIC BLOOD PRESSURE: 74 MMHG

## 2021-10-01 VITALS — SYSTOLIC BLOOD PRESSURE: 121 MMHG | DIASTOLIC BLOOD PRESSURE: 55 MMHG

## 2021-10-01 VITALS — DIASTOLIC BLOOD PRESSURE: 77 MMHG | SYSTOLIC BLOOD PRESSURE: 142 MMHG

## 2021-10-01 VITALS — SYSTOLIC BLOOD PRESSURE: 126 MMHG | DIASTOLIC BLOOD PRESSURE: 64 MMHG

## 2021-10-01 VITALS — SYSTOLIC BLOOD PRESSURE: 81 MMHG | DIASTOLIC BLOOD PRESSURE: 46 MMHG

## 2021-10-01 VITALS — SYSTOLIC BLOOD PRESSURE: 117 MMHG | DIASTOLIC BLOOD PRESSURE: 56 MMHG

## 2021-10-01 VITALS — SYSTOLIC BLOOD PRESSURE: 145 MMHG | DIASTOLIC BLOOD PRESSURE: 85 MMHG

## 2021-10-01 VITALS — SYSTOLIC BLOOD PRESSURE: 102 MMHG | DIASTOLIC BLOOD PRESSURE: 60 MMHG

## 2021-10-01 VITALS — DIASTOLIC BLOOD PRESSURE: 75 MMHG | SYSTOLIC BLOOD PRESSURE: 129 MMHG

## 2021-10-01 VITALS — DIASTOLIC BLOOD PRESSURE: 55 MMHG | SYSTOLIC BLOOD PRESSURE: 121 MMHG

## 2021-10-01 VITALS — SYSTOLIC BLOOD PRESSURE: 140 MMHG | DIASTOLIC BLOOD PRESSURE: 75 MMHG

## 2021-10-01 VITALS — DIASTOLIC BLOOD PRESSURE: 67 MMHG | SYSTOLIC BLOOD PRESSURE: 101 MMHG

## 2021-10-01 VITALS — SYSTOLIC BLOOD PRESSURE: 128 MMHG | DIASTOLIC BLOOD PRESSURE: 75 MMHG

## 2021-10-01 VITALS — DIASTOLIC BLOOD PRESSURE: 63 MMHG | SYSTOLIC BLOOD PRESSURE: 113 MMHG

## 2021-10-01 VITALS — DIASTOLIC BLOOD PRESSURE: 58 MMHG | SYSTOLIC BLOOD PRESSURE: 117 MMHG

## 2021-10-01 LAB
BUN SERPL-MCNC: 15 MG/DL (ref 7–18)
CALCIUM SERPL-MCNC: 8.1 MG/DL (ref 8.5–10.1)
CHLORIDE SERPL-SCNC: 101 MMOL/L (ref 98–107)
CO2 SERPL-SCNC: 36 MMOL/L (ref 21–32)
CREAT SERPL-MCNC: 0.8 MG/DL (ref 0.6–1.3)
GLUCOSE SERPL-MCNC: 123 MG/DL (ref 74–106)
MAGNESIUM SERPL-MCNC: 2 MG/DL (ref 1.8–2.4)
PHOSPHATE SERPL-MCNC: 2.4 MG/DL (ref 2.5–4.9)
POTASSIUM SERPL-SCNC: 3.7 MMOL/L (ref 3.5–5.1)
SODIUM SERPL-SCNC: 138 MMOL/L (ref 136–145)

## 2021-10-01 PROCEDURE — 5A09357 ASSISTANCE WITH RESPIRATORY VENTILATION, LESS THAN 24 CONSECUTIVE HOURS, CONTINUOUS POSITIVE AIRWAY PRESSURE: ICD-10-PCS | Performed by: INTERNAL MEDICINE

## 2021-10-01 RX ADMIN — FAMOTIDINE SCH MG: 20 TABLET, FILM COATED ORAL at 08:39

## 2021-10-01 RX ADMIN — FERROUS SULFATE TAB 325 MG (65 MG ELEMENTAL FE) SCH MG: 325 (65 FE) TAB at 08:40

## 2021-10-01 RX ADMIN — GABAPENTIN SCH MG: 100 CAPSULE ORAL at 17:09

## 2021-10-01 RX ADMIN — TRAMADOL HYDROCHLORIDE PRN MG: 50 TABLET, FILM COATED ORAL at 13:40

## 2021-10-01 RX ADMIN — Medication SCH MG: at 11:33

## 2021-10-01 RX ADMIN — ACETAMINOPHEN PRN MG: 325 TABLET ORAL at 06:46

## 2021-10-01 RX ADMIN — Medication SCH MG: at 07:47

## 2021-10-01 RX ADMIN — TAMSULOSIN HYDROCHLORIDE SCH MG: 0.4 CAPSULE ORAL at 17:07

## 2021-10-01 RX ADMIN — Medication SCH MG: at 03:18

## 2021-10-01 RX ADMIN — GABAPENTIN SCH MG: 100 CAPSULE ORAL at 12:26

## 2021-10-01 RX ADMIN — Medication SCH MG: at 22:14

## 2021-10-01 RX ADMIN — METOPROLOL TARTRATE SCH MG: 50 TABLET, FILM COATED ORAL at 08:39

## 2021-10-01 RX ADMIN — ALBUTEROL SULFATE SCH MG: 1.25 SOLUTION RESPIRATORY (INHALATION) at 15:00

## 2021-10-01 RX ADMIN — ALBUTEROL SULFATE SCH MG: 1.25 SOLUTION RESPIRATORY (INHALATION) at 03:18

## 2021-10-01 RX ADMIN — FLUVOXAMINE MALEATE SCH MG: 50 TABLET, FILM COATED ORAL at 22:14

## 2021-10-01 RX ADMIN — Medication SCH MG: at 15:00

## 2021-10-01 RX ADMIN — DEXTROSE MONOHYDRATE SCH MLS/HR: 50 INJECTION, SOLUTION INTRAVENOUS at 09:12

## 2021-10-01 RX ADMIN — Medication SCH ML: at 17:14

## 2021-10-01 RX ADMIN — PIPERACILLIN SODIUM AND TAZOBACTAM SODIUM SCH MLS/HR: .375; 3 INJECTION, POWDER, LYOPHILIZED, FOR SOLUTION INTRAVENOUS at 03:45

## 2021-10-01 RX ADMIN — TOBRAMYCIN AND DEXAMETHASONE SCH DROP: 3; 1 SUSPENSION/ DROPS OPHTHALMIC at 20:09

## 2021-10-01 RX ADMIN — FAMOTIDINE SCH MG: 20 TABLET, FILM COATED ORAL at 17:08

## 2021-10-01 RX ADMIN — PIPERACILLIN SODIUM AND TAZOBACTAM SODIUM SCH MLS/HR: .375; 3 INJECTION, POWDER, LYOPHILIZED, FOR SOLUTION INTRAVENOUS at 20:08

## 2021-10-01 RX ADMIN — ATORVASTATIN CALCIUM SCH MG: 40 TABLET, FILM COATED ORAL at 22:14

## 2021-10-01 RX ADMIN — ALBUTEROL SULFATE SCH MG: 1.25 SOLUTION RESPIRATORY (INHALATION) at 23:32

## 2021-10-01 RX ADMIN — PIPERACILLIN SODIUM AND TAZOBACTAM SODIUM SCH MLS/HR: .375; 3 INJECTION, POWDER, LYOPHILIZED, FOR SOLUTION INTRAVENOUS at 12:26

## 2021-10-01 RX ADMIN — METOPROLOL TARTRATE SCH MG: 50 TABLET, FILM COATED ORAL at 17:09

## 2021-10-01 RX ADMIN — Medication SCH MG: at 23:32

## 2021-10-01 RX ADMIN — TOBRAMYCIN AND DEXAMETHASONE SCH DROP: 3; 1 SUSPENSION/ DROPS OPHTHALMIC at 07:51

## 2021-10-01 RX ADMIN — TOBRAMYCIN AND DEXAMETHASONE SCH DROP: 3; 1 SUSPENSION/ DROPS OPHTHALMIC at 15:50

## 2021-10-01 RX ADMIN — ALBUTEROL SULFATE SCH MG: 1.25 SOLUTION RESPIRATORY (INHALATION) at 11:33

## 2021-10-01 RX ADMIN — Medication SCH MG: at 19:46

## 2021-10-01 RX ADMIN — GABAPENTIN SCH MG: 100 CAPSULE ORAL at 08:40

## 2021-10-01 RX ADMIN — Medication SCH MG: at 08:39

## 2021-10-01 RX ADMIN — ALBUTEROL SULFATE SCH MG: 1.25 SOLUTION RESPIRATORY (INHALATION) at 07:47

## 2021-10-01 RX ADMIN — TOBRAMYCIN AND DEXAMETHASONE SCH DROP: 3; 1 SUSPENSION/ DROPS OPHTHALMIC at 12:28

## 2021-10-01 RX ADMIN — ALBUTEROL SULFATE SCH MG: 1.25 SOLUTION RESPIRATORY (INHALATION) at 19:46

## 2021-10-01 RX ADMIN — Medication SCH MG: at 17:08

## 2021-10-01 NOTE — NUR
ICU NOTES

Received patient lethargic oriented to name otherwise confused and disoriented 

desaturating to 75%-80% on 3L NC increased O2 to 5L NC saturation up to 86%- 

87%.Page RT.SR per monitor.Denies pain no acute respiratory distress noted.IV 

NS infusing at TKO to RACHEL ML  site intact.FC to gravity.Colostomy active.Patient 

awaiting transfer to [facility].Continue monitoring.

## 2021-10-01 NOTE — NUR
RN NOTES



NOTED PT'S O2 SAT GOING BELOW 88%; COORDINATED WITH RT AND CHARGE RN MADE AWARE. PT WAS 
SWITCHED TO 5L VIA SIMPLE MASKS. 02 SAT AT 98% AT THIS TIME. WILL CONTINUE TO MONITOR AND 
ASSESS UNTIL THE END OF THE SHIFT.

## 2021-10-01 NOTE — NUR
ICU RN NOTES



PATIENT TO BE DISCHARGED TO [facility] TODAY PER MD. STABLE. ON 3L O2, NOT IN ANY 
DISTRESS. AO 2-3 WITH PERIOD OF CONFUSION. SINUS RHYTHM ON MONITOR. NO SIGNS OF PAIN OR 
DISCOMFORT. IV ACCESS LEFT UPPER MIDLINE G 20 FLUSHES WELL, SITE CLEAR CDI DRESSING. 
COLOSTOMY BAG IN PLACE, DRAINED 350 ML AMOUNT. ANDERS CATH IN PLACE PATENT INTACT, WITH 1208 
ML OUTPUT. CLEAR YELLOW. ATTENDED  TO ALL NEEDS. PM CARE DONE. WOUND TREATMENT DONE. TURNED 
AND REPOSITIONED Q 2HOURS. ALL PAPER WORKS DONE AND SIGNED BY 2 NURSES. BELONGINGS CHECKED. 

PATIENT'S CELLPHONE IN ICU SAFE, TO BE PICKED UP BY DAUGHTER TOMORROW AM.

SAFETY MEASURES IN PLACE. CALL LIGHT WITHIN REACH. REFUSED PICTURE. GRIMACING AND CRANKY.

REPORT TO BE GIVEN TO FACILITY AFTER 1930. TO BE PICKED UP BY AMBULANCE AT 2100.



WILL ENDORSE TO NEXT SHIFT FOR ALVNI.

## 2021-10-01 NOTE — NUR
RN NOTES



PATIENT REMAINED TO BE IN NO SIGNS OF ACUTE RESPIRATORY DISTRESS , VITAL SIGNS WNL AT THIS 
TIME. WILL CONTINUE TO MONITOR AND REASSESS FOR ANY CHANGES THROUGHOUT THE SHIFT.

## 2021-10-01 NOTE — NUR
ICU RN NOTES



PLACED A CALL TO [facility], SPOKE WITH ADRYAN

NO ONE AVAILABLE TO TAKE REPORT AND SAID TO CALL BACK AFTER 1900.

## 2021-10-01 NOTE — NUR
RN NOTES



PATIENT FOR DISCHARGE TO [facility] TODAY. PATIENT CLEARED  BY DR. KWONG PULMONOLOGIST.

 

PER CASE MANAGEMENT: - 10/01/2021 @ 1611: Per Tatyana at [facility] [phone], pt has been

accepted to [facility] located at [address] [place] [address] to rm #[id number]. Per hospitalist, pt is medically stable and cleared

for d/c to [facility], however, awaiting pulmonary clearance. Pt and family,

daughter Vidya [phone] are aware and agreeable with DCP to [facility].

CM arranged BLS transportation with [facility] [phone] at

will call whenever pulmonologist clears pt. [facility] # for report [phone] option 3 ext. [phone] Pt will f/u with PCP.



Facility: [facility]

Address [address] [place] [address]

Tel for report: [phone], option 3, ext. [phone]

 #[id number]

Transport: [facility] [phone]

P/U: will call



@ 2194: Pt cleared by pulmonologist.  via [facility] [phone] is scheduled for 8pm. No other d/c needs at this time. CN and 

primary nurse aware.

## 2021-10-01 NOTE — NUR
ICU RN NOTES

1945 RT at bedside and placed patient on Bipap 22/5,16,30% saturation up to 94%.-

95%.Called AM [place] spoke to Pavan if they have RT to transport patient 

to [facility] on BIPAP.He said no more RT at this time maybe tomorrow.

1949 Called [facility] spoke to ANSON Charge Nurse  to give report about the transfer.

She said no staff to  take care of this patient probably in AM.

1951 Called [facility] spoke to Pavan to cancel transfer of this patient to 

[place]. 

2000 Uvaldo IGVENS here notified patient will not be transferred to [facility] 

For the reason no staff to take care of this patient.He said OK.

## 2021-10-01 NOTE — NUR
RN NOTES



PT OFF HOOKED FROM BIPAB BY RT AND SWITCHED TO 3L OF 02 VIA NC. CHARGE RN MADE AWARE. WILL 
MONITOR AND ASSESS.

## 2021-10-01 NOTE — NUR
RN CLOSING NOTE: 



PATIENT REMAINS IN ROOM IN NO SIGNS OF RESPIRATORY DISTRESS, PATIENT CURRENTLY ON 5L OF 02 
VIA SIMPLE MASKS ;TOLERATING WELL SATURATING @ >95% SP02. SAFETY MEASURES IMPLEMENTED, BED 
IN LOWEST POSITION, LOCKED, SIDE RAILS UP, CALL LIGHT WITHIN REACH. ALL NEEDS AND ORDERS 
ADDRESSED DURING THE SHIFT. IV ACCESS MAINTAINED INTACT, SECURED AND FLUSHING WELL.  ALL DUE 
MEDS GIVEN AS ORDERED & SCHEDULED ; PATIENT TOLERATED WELL. PATIENT KEPT CLEAN AND 
COMFORTABLE WITHIN THE SHIFT. PATIENT ENDORSED TO INCOMING SHIFT RN WITH STABLE VITAL SIGN 
AND FOR CONTINUITY OF CARE.

## 2021-10-01 NOTE — NUR
NURSE OPENING NOTE



RECEIVE REPORT FROM EVENING NURSE. PATIENT IS STABLE WITH NO SIGN OF DISTRESS. CURRENTLY ON 
SIMPLE FACE MASK WITH 5L OF OXYGEN. O2 SAT %. COLOSTOMY BAG INTACT AND ANDERS IN PLACE. 
BED IN LOWEST POSITION WITH HEAD OF THE BED ELEVATED. WILL CONTINUE TO MONITOR.

## 2021-10-02 VITALS — DIASTOLIC BLOOD PRESSURE: 73 MMHG | SYSTOLIC BLOOD PRESSURE: 136 MMHG

## 2021-10-02 VITALS — SYSTOLIC BLOOD PRESSURE: 126 MMHG | DIASTOLIC BLOOD PRESSURE: 73 MMHG

## 2021-10-02 VITALS — DIASTOLIC BLOOD PRESSURE: 70 MMHG | SYSTOLIC BLOOD PRESSURE: 112 MMHG

## 2021-10-02 VITALS — SYSTOLIC BLOOD PRESSURE: 138 MMHG | DIASTOLIC BLOOD PRESSURE: 73 MMHG

## 2021-10-02 VITALS — SYSTOLIC BLOOD PRESSURE: 130 MMHG | DIASTOLIC BLOOD PRESSURE: 84 MMHG

## 2021-10-02 VITALS — SYSTOLIC BLOOD PRESSURE: 158 MMHG | DIASTOLIC BLOOD PRESSURE: 82 MMHG

## 2021-10-02 VITALS — SYSTOLIC BLOOD PRESSURE: 113 MMHG | DIASTOLIC BLOOD PRESSURE: 50 MMHG

## 2021-10-02 VITALS — SYSTOLIC BLOOD PRESSURE: 136 MMHG | DIASTOLIC BLOOD PRESSURE: 73 MMHG

## 2021-10-02 VITALS — DIASTOLIC BLOOD PRESSURE: 82 MMHG | SYSTOLIC BLOOD PRESSURE: 158 MMHG

## 2021-10-02 VITALS — DIASTOLIC BLOOD PRESSURE: 65 MMHG | SYSTOLIC BLOOD PRESSURE: 128 MMHG

## 2021-10-02 VITALS — DIASTOLIC BLOOD PRESSURE: 71 MMHG | SYSTOLIC BLOOD PRESSURE: 127 MMHG

## 2021-10-02 VITALS — SYSTOLIC BLOOD PRESSURE: 105 MMHG | DIASTOLIC BLOOD PRESSURE: 49 MMHG

## 2021-10-02 VITALS — SYSTOLIC BLOOD PRESSURE: 122 MMHG | DIASTOLIC BLOOD PRESSURE: 70 MMHG

## 2021-10-02 VITALS — DIASTOLIC BLOOD PRESSURE: 75 MMHG | SYSTOLIC BLOOD PRESSURE: 145 MMHG

## 2021-10-02 VITALS — DIASTOLIC BLOOD PRESSURE: 81 MMHG | SYSTOLIC BLOOD PRESSURE: 133 MMHG

## 2021-10-02 VITALS — SYSTOLIC BLOOD PRESSURE: 141 MMHG | DIASTOLIC BLOOD PRESSURE: 64 MMHG

## 2021-10-02 VITALS — SYSTOLIC BLOOD PRESSURE: 132 MMHG | DIASTOLIC BLOOD PRESSURE: 62 MMHG

## 2021-10-02 VITALS — DIASTOLIC BLOOD PRESSURE: 83 MMHG | SYSTOLIC BLOOD PRESSURE: 136 MMHG

## 2021-10-02 LAB
BASOPHILS # BLD AUTO: 0 K/UL (ref 0–0.2)
BASOPHILS NFR BLD AUTO: 0.7 % (ref 0–2)
BUN SERPL-MCNC: 11 MG/DL (ref 7–18)
CALCIUM SERPL-MCNC: 8.4 MG/DL (ref 8.5–10.1)
CHLORIDE SERPL-SCNC: 102 MMOL/L (ref 98–107)
CO2 SERPL-SCNC: 36 MMOL/L (ref 21–32)
CREAT SERPL-MCNC: 0.8 MG/DL (ref 0.6–1.3)
EOSINOPHIL NFR BLD AUTO: 0.7 % (ref 0–6)
GLUCOSE SERPL-MCNC: 120 MG/DL (ref 74–106)
HCT VFR BLD AUTO: 35 % (ref 39–51)
HGB BLD-MCNC: 11.7 G/DL (ref 13.5–17.5)
LYMPHOCYTES NFR BLD AUTO: 0.6 K/UL (ref 0.8–4.8)
LYMPHOCYTES NFR BLD AUTO: 9.6 % (ref 20–44)
MAGNESIUM SERPL-MCNC: 1.8 MG/DL (ref 1.8–2.4)
MCHC RBC AUTO-ENTMCNC: 34 G/DL (ref 31–36)
MCV RBC AUTO: 99 FL (ref 80–96)
MONOCYTES NFR BLD AUTO: 0.9 K/UL (ref 0.1–1.3)
MONOCYTES NFR BLD AUTO: 14.7 % (ref 2–12)
NEUTROPHILS # BLD AUTO: 4.5 K/UL (ref 1.8–8.9)
NEUTROPHILS NFR BLD AUTO: 74.3 % (ref 43–81)
PHOSPHATE SERPL-MCNC: 2.6 MG/DL (ref 2.5–4.9)
PLATELET # BLD AUTO: 238 K/UL (ref 150–450)
POTASSIUM SERPL-SCNC: 3.5 MMOL/L (ref 3.5–5.1)
RBC # BLD AUTO: 3.49 MIL/UL (ref 4.5–6)
SODIUM SERPL-SCNC: 141 MMOL/L (ref 136–145)
WBC NRBC COR # BLD AUTO: 6.1 K/UL (ref 4.3–11)

## 2021-10-02 RX ADMIN — DEXTROSE MONOHYDRATE SCH MLS/HR: 50 INJECTION, SOLUTION INTRAVENOUS at 08:43

## 2021-10-02 RX ADMIN — ALBUTEROL SULFATE SCH MG: 1.25 SOLUTION RESPIRATORY (INHALATION) at 11:15

## 2021-10-02 RX ADMIN — ALBUTEROL SULFATE SCH MG: 1.25 SOLUTION RESPIRATORY (INHALATION) at 15:05

## 2021-10-02 RX ADMIN — SODIUM CHLORIDE PRN MLS/HR: 9 INJECTION, SOLUTION INTRAVENOUS at 04:54

## 2021-10-02 RX ADMIN — ALBUTEROL SULFATE SCH MG: 1.25 SOLUTION RESPIRATORY (INHALATION) at 07:54

## 2021-10-02 RX ADMIN — PIPERACILLIN SODIUM AND TAZOBACTAM SODIUM SCH MLS/HR: .375; 3 INJECTION, POWDER, LYOPHILIZED, FOR SOLUTION INTRAVENOUS at 04:11

## 2021-10-02 RX ADMIN — Medication SCH MG: at 04:01

## 2021-10-02 RX ADMIN — PIPERACILLIN SODIUM AND TAZOBACTAM SODIUM SCH MLS/HR: .375; 3 INJECTION, POWDER, LYOPHILIZED, FOR SOLUTION INTRAVENOUS at 12:34

## 2021-10-02 RX ADMIN — METOPROLOL TARTRATE SCH MG: 50 TABLET, FILM COATED ORAL at 08:46

## 2021-10-02 RX ADMIN — Medication SCH MG: at 07:54

## 2021-10-02 RX ADMIN — TOBRAMYCIN AND DEXAMETHASONE SCH DROP: 3; 1 SUSPENSION/ DROPS OPHTHALMIC at 11:29

## 2021-10-02 RX ADMIN — ALBUTEROL SULFATE SCH MG: 1.25 SOLUTION RESPIRATORY (INHALATION) at 04:01

## 2021-10-02 RX ADMIN — FERROUS SULFATE TAB 325 MG (65 MG ELEMENTAL FE) SCH MG: 325 (65 FE) TAB at 08:44

## 2021-10-02 RX ADMIN — Medication SCH ML: at 08:45

## 2021-10-02 RX ADMIN — Medication SCH MG: at 15:05

## 2021-10-02 RX ADMIN — Medication SCH MG: at 08:44

## 2021-10-02 RX ADMIN — GABAPENTIN SCH MG: 100 CAPSULE ORAL at 12:39

## 2021-10-02 RX ADMIN — ACETAMINOPHEN PRN MG: 325 TABLET ORAL at 14:07

## 2021-10-02 RX ADMIN — Medication SCH MG: at 11:15

## 2021-10-02 RX ADMIN — GABAPENTIN SCH MG: 100 CAPSULE ORAL at 08:44

## 2021-10-02 RX ADMIN — FAMOTIDINE SCH MG: 20 TABLET, FILM COATED ORAL at 08:43

## 2021-10-02 RX ADMIN — TOBRAMYCIN AND DEXAMETHASONE SCH DROP: 3; 1 SUSPENSION/ DROPS OPHTHALMIC at 07:58

## 2021-10-02 NOTE — NUR
Patient resting in no acute distress.VS stable.Tolerating BIPAP.AM care done for 
comfort.Turned and repositioned.

## 2021-10-02 NOTE — NUR
ICU RN NOTES



RECEIVED PT ON BIPAP. NO SOB OR ANY RESPIRATORY DISTRESS NOTED. SR ON TELE MONITOR. 
COLOSTOMY BAG IN PLACE. ANDERS CATH DRAINING INTO YELLOW COLORED URINE BY GRAVITY. RESTRAINTS 
ON R HAND, CHECKED FOR CIRCULATION. ON SOFT DIET. RACHEL MIDLINE INTACT, PATENT AND FLUSHED. 
SAFETY MEASURES IN PLACE. CALL LIGHT WITHIN REACH. BED LOCKED AND IN LOWEST POSITION WITH 
SIDE RAILS UP X3. WILL CONTINUE TO MONITOR.

## 2021-10-02 NOTE — NUR
Patient slept most of the night.VS remains stable.Remain on BIPAP.No acute distress 
noted.Was turned and repositioned Q 2 hrs.All due medications administered.Will endorse to 
day shift for further care and management.Patient to transfer to [facility] today and 
daughter Vidya will come to pick -up patient Cell phone.

## 2021-10-02 NOTE — NUR
ICU RN NOTES



PICKED UP BY AMBULANCE CREW. VS STABLE. ON 3L O2 VIA NC. NO SOB OR ANY DISTRESS NOTED. NO 
PAIN REPORTED AT THIS TIME. BELONGINGS SENT WITH ROSS (DAUGHTER). KEPT CLEAN AND 
COMFORTABLE. REPORT GIVEN TO GRAYSON LEMUS OF [facility].